# Patient Record
Sex: MALE | Race: WHITE | NOT HISPANIC OR LATINO | Employment: FULL TIME | URBAN - METROPOLITAN AREA
[De-identification: names, ages, dates, MRNs, and addresses within clinical notes are randomized per-mention and may not be internally consistent; named-entity substitution may affect disease eponyms.]

---

## 2017-02-08 ENCOUNTER — OFFICE VISIT - HEALTHEAST (OUTPATIENT)
Dept: FAMILY MEDICINE | Facility: CLINIC | Age: 32
End: 2017-02-08

## 2017-02-08 DIAGNOSIS — E78.00 HYPERCHOLESTEREMIA: ICD-10-CM

## 2017-02-08 DIAGNOSIS — Z00.00 ROUTINE GENERAL MEDICAL EXAMINATION AT A HEALTH CARE FACILITY: ICD-10-CM

## 2017-02-08 DIAGNOSIS — Z30.09 ENCOUNTER FOR VASECTOMY COUNSELING: ICD-10-CM

## 2017-02-08 DIAGNOSIS — L91.8 SKIN TAG: ICD-10-CM

## 2017-02-08 DIAGNOSIS — E66.09 NON MORBID OBESITY DUE TO EXCESS CALORIES: ICD-10-CM

## 2017-02-08 LAB — HBA1C MFR BLD: 6.1 % (ref 3.5–6)

## 2017-02-08 ASSESSMENT — MIFFLIN-ST. JEOR: SCORE: 2349.21

## 2017-02-09 ENCOUNTER — COMMUNICATION - HEALTHEAST (OUTPATIENT)
Dept: FAMILY MEDICINE | Facility: CLINIC | Age: 32
End: 2017-02-09

## 2017-02-09 LAB
CHOLEST SERPL-MCNC: 210 MG/DL
FASTING STATUS PATIENT QL REPORTED: NO
HDLC SERPL-MCNC: 49 MG/DL

## 2017-03-31 ENCOUNTER — AMBULATORY - HEALTHEAST (OUTPATIENT)
Dept: FAMILY MEDICINE | Facility: CLINIC | Age: 32
End: 2017-03-31

## 2017-03-31 DIAGNOSIS — L91.8 SKIN TAG: ICD-10-CM

## 2017-03-31 DIAGNOSIS — Z30.2 ENCOUNTER FOR VASECTOMY: ICD-10-CM

## 2017-04-04 LAB
LAB AP CHARGES (HE HISTORICAL CONVERSION): NORMAL
PATH REPORT.COMMENTS IMP SPEC: NORMAL
PATH REPORT.FINAL DX SPEC: NORMAL
PATH REPORT.GROSS SPEC: NORMAL
PATH REPORT.MICROSCOPIC SPEC OTHER STN: NORMAL
PATH REPORT.RELEVANT HX SPEC: NORMAL
RESULT FLAG (HE HISTORICAL CONVERSION): NORMAL

## 2017-06-01 ENCOUNTER — COMMUNICATION - HEALTHEAST (OUTPATIENT)
Dept: FAMILY MEDICINE | Facility: CLINIC | Age: 32
End: 2017-06-01

## 2017-06-27 ENCOUNTER — AMBULATORY - HEALTHEAST (OUTPATIENT)
Dept: LAB | Facility: CLINIC | Age: 32
End: 2017-06-27

## 2017-06-27 DIAGNOSIS — Z30.2 ENCOUNTER FOR VASECTOMY: ICD-10-CM

## 2017-11-09 ENCOUNTER — OFFICE VISIT - HEALTHEAST (OUTPATIENT)
Dept: FAMILY MEDICINE | Facility: CLINIC | Age: 32
End: 2017-11-09

## 2017-11-09 DIAGNOSIS — D49.2 ATYPICAL SQUAMOPROLIFERATIVE SKIN LESION: ICD-10-CM

## 2018-06-04 ENCOUNTER — OFFICE VISIT - HEALTHEAST (OUTPATIENT)
Dept: FAMILY MEDICINE | Facility: CLINIC | Age: 33
End: 2018-06-04

## 2018-06-04 DIAGNOSIS — M25.569 PAIN IN JOINT, LOWER LEG: ICD-10-CM

## 2018-06-04 DIAGNOSIS — E66.3 OVERWEIGHT: ICD-10-CM

## 2018-06-04 DIAGNOSIS — Z00.00 ROUTINE GENERAL MEDICAL EXAMINATION AT A HEALTH CARE FACILITY: ICD-10-CM

## 2018-06-04 DIAGNOSIS — M54.9 BACK PAIN: ICD-10-CM

## 2018-06-04 DIAGNOSIS — K14.6 PAINFUL TONGUE: ICD-10-CM

## 2018-06-04 DIAGNOSIS — Z87.891 SMOKING HISTORY: ICD-10-CM

## 2018-06-04 DIAGNOSIS — R68.2 DRY MOUTH: ICD-10-CM

## 2018-06-04 LAB
ALBUMIN SERPL-MCNC: 3.6 G/DL (ref 3.5–5)
ALP SERPL-CCNC: 82 U/L (ref 45–120)
ALT SERPL W P-5'-P-CCNC: 197 U/L (ref 0–45)
ANION GAP SERPL CALCULATED.3IONS-SCNC: 8 MMOL/L (ref 5–18)
AST SERPL W P-5'-P-CCNC: 106 U/L (ref 0–40)
BILIRUB SERPL-MCNC: 0.4 MG/DL (ref 0–1)
BUN SERPL-MCNC: 13 MG/DL (ref 8–22)
CALCIUM SERPL-MCNC: 9.5 MG/DL (ref 8.5–10.5)
CHLORIDE BLD-SCNC: 107 MMOL/L (ref 98–107)
CHOLEST SERPL-MCNC: 204 MG/DL
CO2 SERPL-SCNC: 25 MMOL/L (ref 22–31)
CREAT SERPL-MCNC: 0.71 MG/DL (ref 0.7–1.3)
FASTING STATUS PATIENT QL REPORTED: YES
GFR SERPL CREATININE-BSD FRML MDRD: >60 ML/MIN/1.73M2
GLUCOSE BLD-MCNC: 122 MG/DL (ref 70–125)
HBA1C MFR BLD: 6.4 % (ref 3.5–6)
HDLC SERPL-MCNC: 43 MG/DL
LDLC SERPL CALC-MCNC: 123 MG/DL
POTASSIUM BLD-SCNC: 4.4 MMOL/L (ref 3.5–5)
PROT SERPL-MCNC: 7.6 G/DL (ref 6–8)
SODIUM SERPL-SCNC: 140 MMOL/L (ref 136–145)
TRIGL SERPL-MCNC: 192 MG/DL

## 2018-06-04 ASSESSMENT — MIFFLIN-ST. JEOR: SCORE: 2432.66

## 2018-06-06 ENCOUNTER — AMBULATORY - HEALTHEAST (OUTPATIENT)
Dept: FAMILY MEDICINE | Facility: CLINIC | Age: 33
End: 2018-06-06

## 2018-06-06 ENCOUNTER — COMMUNICATION - HEALTHEAST (OUTPATIENT)
Dept: FAMILY MEDICINE | Facility: CLINIC | Age: 33
End: 2018-06-06

## 2018-06-06 DIAGNOSIS — R74.8 ELEVATED LIVER ENZYMES: ICD-10-CM

## 2018-06-14 ENCOUNTER — AMBULATORY - HEALTHEAST (OUTPATIENT)
Dept: LAB | Facility: CLINIC | Age: 33
End: 2018-06-14

## 2018-06-14 DIAGNOSIS — R74.8 ELEVATED LIVER ENZYMES: ICD-10-CM

## 2018-06-14 LAB
ALBUMIN SERPL-MCNC: 3.8 G/DL (ref 3.5–5)
ALP SERPL-CCNC: 82 U/L (ref 45–120)
ALT SERPL W P-5'-P-CCNC: 257 U/L (ref 0–45)
AST SERPL W P-5'-P-CCNC: 181 U/L (ref 0–40)
BILIRUB DIRECT SERPL-MCNC: 0.4 MG/DL
BILIRUB SERPL-MCNC: 1.3 MG/DL (ref 0–1)
GGT SERPL-CCNC: 81 U/L (ref 0–50)
PROT SERPL-MCNC: 7.5 G/DL (ref 6–8)

## 2018-06-15 ENCOUNTER — AMBULATORY - HEALTHEAST (OUTPATIENT)
Dept: FAMILY MEDICINE | Facility: CLINIC | Age: 33
End: 2018-06-15

## 2018-06-15 ENCOUNTER — COMMUNICATION - HEALTHEAST (OUTPATIENT)
Dept: FAMILY MEDICINE | Facility: CLINIC | Age: 33
End: 2018-06-15

## 2018-06-15 DIAGNOSIS — R74.8 ELEVATED LIVER ENZYMES: ICD-10-CM

## 2018-06-15 LAB
HAV IGM SERPL QL IA: NEGATIVE
HBV CORE IGM SERPL QL IA: NEGATIVE
HBV SURFACE AG SERPL QL IA: NEGATIVE
HCV AB SERPL QL IA: NEGATIVE

## 2018-06-16 ENCOUNTER — HOSPITAL ENCOUNTER (OUTPATIENT)
Dept: ULTRASOUND IMAGING | Facility: CLINIC | Age: 33
Discharge: HOME | End: 2018-06-16

## 2018-06-16 DIAGNOSIS — R74.8 ELEVATED LIVER ENZYMES: ICD-10-CM

## 2018-06-21 ENCOUNTER — COMMUNICATION - HEALTHEAST (OUTPATIENT)
Dept: FAMILY MEDICINE | Facility: CLINIC | Age: 33
End: 2018-06-21

## 2018-06-21 ENCOUNTER — OFFICE VISIT - HEALTHEAST (OUTPATIENT)
Dept: OTOLARYNGOLOGY | Facility: CLINIC | Age: 33
End: 2018-06-21

## 2018-06-21 DIAGNOSIS — K14.6 BURNING TONGUE: ICD-10-CM

## 2019-01-04 ENCOUNTER — OFFICE VISIT - HEALTHEAST (OUTPATIENT)
Dept: FAMILY MEDICINE | Facility: CLINIC | Age: 34
End: 2019-01-04

## 2019-01-04 DIAGNOSIS — R73.01 IMPAIRED FASTING GLUCOSE: ICD-10-CM

## 2019-01-04 DIAGNOSIS — M79.622 PAIN OF LEFT UPPER ARM: ICD-10-CM

## 2019-01-04 DIAGNOSIS — R79.89 ABNORMAL LFTS: ICD-10-CM

## 2019-01-04 DIAGNOSIS — Z00.00 ROUTINE GENERAL MEDICAL EXAMINATION AT A HEALTH CARE FACILITY: ICD-10-CM

## 2019-01-04 DIAGNOSIS — M25.562 LEFT KNEE PAIN, UNSPECIFIED CHRONICITY: ICD-10-CM

## 2019-01-04 DIAGNOSIS — F41.9 ANXIETY DISORDER, UNSPECIFIED TYPE: ICD-10-CM

## 2019-01-04 DIAGNOSIS — E66.812 CLASS 2 OBESITY DUE TO EXCESS CALORIES WITHOUT SERIOUS COMORBIDITY WITH BODY MASS INDEX (BMI) OF 35.0 TO 35.9 IN ADULT: ICD-10-CM

## 2019-01-04 DIAGNOSIS — K76.0 HEPATIC STEATOSIS: ICD-10-CM

## 2019-01-04 DIAGNOSIS — E66.09 CLASS 2 OBESITY DUE TO EXCESS CALORIES WITHOUT SERIOUS COMORBIDITY WITH BODY MASS INDEX (BMI) OF 35.0 TO 35.9 IN ADULT: ICD-10-CM

## 2019-01-04 LAB
ALBUMIN SERPL-MCNC: 4.2 G/DL (ref 3.5–5)
ALP SERPL-CCNC: 58 U/L (ref 45–120)
ALT SERPL W P-5'-P-CCNC: 99 U/L (ref 0–45)
ANION GAP SERPL CALCULATED.3IONS-SCNC: 6 MMOL/L (ref 5–18)
AST SERPL W P-5'-P-CCNC: 61 U/L (ref 0–40)
BILIRUB SERPL-MCNC: 1.1 MG/DL (ref 0–1)
BUN SERPL-MCNC: 15 MG/DL (ref 8–22)
CALCIUM SERPL-MCNC: 9.6 MG/DL (ref 8.5–10.5)
CHLORIDE BLD-SCNC: 105 MMOL/L (ref 98–107)
CHOLEST SERPL-MCNC: 205 MG/DL
CO2 SERPL-SCNC: 29 MMOL/L (ref 22–31)
CREAT SERPL-MCNC: 0.86 MG/DL (ref 0.7–1.3)
ERYTHROCYTE [DISTWIDTH] IN BLOOD BY AUTOMATED COUNT: 11.8 % (ref 11–14.5)
FASTING STATUS PATIENT QL REPORTED: YES
GFR SERPL CREATININE-BSD FRML MDRD: >60 ML/MIN/1.73M2
GGT SERPL-CCNC: 53 U/L (ref 0–50)
GLUCOSE BLD-MCNC: 82 MG/DL (ref 70–125)
HBA1C MFR BLD: 5.6 % (ref 3.5–6)
HCT VFR BLD AUTO: 44.1 % (ref 40–54)
HDLC SERPL-MCNC: 65 MG/DL
HGB BLD-MCNC: 15.4 G/DL (ref 14–18)
LDLC SERPL CALC-MCNC: 126 MG/DL
MCH RBC QN AUTO: 31.8 PG (ref 27–34)
MCHC RBC AUTO-ENTMCNC: 34.8 G/DL (ref 32–36)
MCV RBC AUTO: 91 FL (ref 80–100)
PLATELET # BLD AUTO: 115 THOU/UL (ref 140–440)
PMV BLD AUTO: 8.3 FL (ref 7–10)
POTASSIUM BLD-SCNC: 4.4 MMOL/L (ref 3.5–5)
PROT SERPL-MCNC: 8.2 G/DL (ref 6–8)
RBC # BLD AUTO: 4.83 MILL/UL (ref 4.4–6.2)
SODIUM SERPL-SCNC: 140 MMOL/L (ref 136–145)
TRIGL SERPL-MCNC: 71 MG/DL
WBC: 5.3 THOU/UL (ref 4–11)

## 2019-01-04 ASSESSMENT — MIFFLIN-ST. JEOR: SCORE: 2298.73

## 2019-04-04 ENCOUNTER — OFFICE VISIT - HEALTHEAST (OUTPATIENT)
Dept: FAMILY MEDICINE | Facility: CLINIC | Age: 34
End: 2019-04-04

## 2019-04-04 DIAGNOSIS — E66.09 CLASS 1 OBESITY DUE TO EXCESS CALORIES WITHOUT SERIOUS COMORBIDITY WITH BODY MASS INDEX (BMI) OF 33.0 TO 33.9 IN ADULT: ICD-10-CM

## 2019-04-04 DIAGNOSIS — R68.82 DECREASED LIBIDO: ICD-10-CM

## 2019-04-04 DIAGNOSIS — M54.42 ACUTE LEFT-SIDED LOW BACK PAIN WITH LEFT-SIDED SCIATICA: ICD-10-CM

## 2019-04-04 DIAGNOSIS — F41.9 ANXIETY DISORDER, UNSPECIFIED TYPE: ICD-10-CM

## 2019-04-04 DIAGNOSIS — E66.811 CLASS 1 OBESITY DUE TO EXCESS CALORIES WITHOUT SERIOUS COMORBIDITY WITH BODY MASS INDEX (BMI) OF 33.0 TO 33.9 IN ADULT: ICD-10-CM

## 2019-04-04 DIAGNOSIS — R79.89 ABNORMAL LFTS: ICD-10-CM

## 2019-04-05 LAB
ALBUMIN SERPL-MCNC: 4.3 G/DL (ref 3.5–5)
ALP SERPL-CCNC: 48 U/L (ref 45–120)
ALT SERPL W P-5'-P-CCNC: 35 U/L (ref 0–45)
ANION GAP SERPL CALCULATED.3IONS-SCNC: 8 MMOL/L (ref 5–18)
AST SERPL W P-5'-P-CCNC: 33 U/L (ref 0–40)
BILIRUB SERPL-MCNC: 0.7 MG/DL (ref 0–1)
BUN SERPL-MCNC: 15 MG/DL (ref 8–22)
CALCIUM SERPL-MCNC: 10.2 MG/DL (ref 8.5–10.5)
CHLORIDE BLD-SCNC: 105 MMOL/L (ref 98–107)
CO2 SERPL-SCNC: 25 MMOL/L (ref 22–31)
CREAT SERPL-MCNC: 0.79 MG/DL (ref 0.7–1.3)
GFR SERPL CREATININE-BSD FRML MDRD: >60 ML/MIN/1.73M2
GGT SERPL-CCNC: 35 U/L (ref 0–50)
GLUCOSE BLD-MCNC: 77 MG/DL (ref 70–125)
POTASSIUM BLD-SCNC: 4.6 MMOL/L (ref 3.5–5)
PROT SERPL-MCNC: 8.3 G/DL (ref 6–8)
SODIUM SERPL-SCNC: 138 MMOL/L (ref 136–145)

## 2019-06-03 ENCOUNTER — COMMUNICATION - HEALTHEAST (OUTPATIENT)
Dept: FAMILY MEDICINE | Facility: CLINIC | Age: 34
End: 2019-06-03

## 2019-06-03 DIAGNOSIS — F41.9 ANXIETY DISORDER, UNSPECIFIED TYPE: ICD-10-CM

## 2019-06-04 ENCOUNTER — AMBULATORY - HEALTHEAST (OUTPATIENT)
Dept: FAMILY MEDICINE | Facility: CLINIC | Age: 34
End: 2019-06-04

## 2019-06-04 DIAGNOSIS — M54.42 ACUTE LEFT-SIDED LOW BACK PAIN WITH LEFT-SIDED SCIATICA: ICD-10-CM

## 2019-06-07 ENCOUNTER — HOSPITAL ENCOUNTER (OUTPATIENT)
Dept: PHYSICAL MEDICINE AND REHAB | Facility: CLINIC | Age: 34
Discharge: HOME | End: 2019-06-07
Attending: FAMILY MEDICINE

## 2019-06-07 ENCOUNTER — HOSPITAL ENCOUNTER (OUTPATIENT)
Dept: RADIOLOGY | Facility: HOSPITAL | Age: 34
Discharge: HOME | End: 2019-06-07

## 2019-06-07 ENCOUNTER — COMMUNICATION - HEALTHEAST (OUTPATIENT)
Dept: PHYSICAL MEDICINE AND REHAB | Facility: CLINIC | Age: 34
End: 2019-06-07

## 2019-06-07 DIAGNOSIS — M54.16 CHRONIC LEFT LUMBAR RADICULOPATHY: ICD-10-CM

## 2019-06-07 DIAGNOSIS — G89.29 CHRONIC LEFT-SIDED LOW BACK PAIN WITH LEFT-SIDED SCIATICA: ICD-10-CM

## 2019-06-07 DIAGNOSIS — M54.42 CHRONIC LEFT-SIDED LOW BACK PAIN WITH LEFT-SIDED SCIATICA: ICD-10-CM

## 2019-06-11 ENCOUNTER — OFFICE VISIT - HEALTHEAST (OUTPATIENT)
Dept: PHYSICAL THERAPY | Facility: CLINIC | Age: 34
End: 2019-06-11

## 2019-06-11 DIAGNOSIS — M54.42 CHRONIC LEFT-SIDED LOW BACK PAIN WITH LEFT-SIDED SCIATICA: ICD-10-CM

## 2019-06-11 DIAGNOSIS — G89.29 CHRONIC LEFT-SIDED LOW BACK PAIN WITH LEFT-SIDED SCIATICA: ICD-10-CM

## 2019-06-14 ENCOUNTER — OFFICE VISIT - HEALTHEAST (OUTPATIENT)
Dept: PHYSICAL THERAPY | Facility: CLINIC | Age: 34
End: 2019-06-14

## 2019-06-14 DIAGNOSIS — G89.29 CHRONIC LEFT-SIDED LOW BACK PAIN WITH LEFT-SIDED SCIATICA: ICD-10-CM

## 2019-06-14 DIAGNOSIS — M54.42 CHRONIC LEFT-SIDED LOW BACK PAIN WITH LEFT-SIDED SCIATICA: ICD-10-CM

## 2019-06-17 ENCOUNTER — OFFICE VISIT - HEALTHEAST (OUTPATIENT)
Dept: PHYSICAL THERAPY | Facility: CLINIC | Age: 34
End: 2019-06-17

## 2019-06-17 DIAGNOSIS — G89.29 CHRONIC LEFT-SIDED LOW BACK PAIN WITH LEFT-SIDED SCIATICA: ICD-10-CM

## 2019-06-17 DIAGNOSIS — M54.42 CHRONIC LEFT-SIDED LOW BACK PAIN WITH LEFT-SIDED SCIATICA: ICD-10-CM

## 2019-06-21 ENCOUNTER — OFFICE VISIT - HEALTHEAST (OUTPATIENT)
Dept: PHYSICAL THERAPY | Facility: CLINIC | Age: 34
End: 2019-06-21

## 2019-06-21 DIAGNOSIS — G89.29 CHRONIC LEFT-SIDED LOW BACK PAIN WITH LEFT-SIDED SCIATICA: ICD-10-CM

## 2019-06-21 DIAGNOSIS — M54.42 CHRONIC LEFT-SIDED LOW BACK PAIN WITH LEFT-SIDED SCIATICA: ICD-10-CM

## 2019-06-24 ENCOUNTER — OFFICE VISIT - HEALTHEAST (OUTPATIENT)
Dept: PHYSICAL THERAPY | Facility: CLINIC | Age: 34
End: 2019-06-24

## 2019-06-24 DIAGNOSIS — G89.29 CHRONIC LEFT-SIDED LOW BACK PAIN WITH LEFT-SIDED SCIATICA: ICD-10-CM

## 2019-06-24 DIAGNOSIS — M54.42 CHRONIC LEFT-SIDED LOW BACK PAIN WITH LEFT-SIDED SCIATICA: ICD-10-CM

## 2019-06-27 ENCOUNTER — COMMUNICATION - HEALTHEAST (OUTPATIENT)
Dept: PHYSICAL THERAPY | Facility: CLINIC | Age: 34
End: 2019-06-27

## 2019-07-03 ENCOUNTER — OFFICE VISIT - HEALTHEAST (OUTPATIENT)
Dept: PHYSICAL THERAPY | Facility: CLINIC | Age: 34
End: 2019-07-03

## 2019-07-03 DIAGNOSIS — M54.42 CHRONIC LEFT-SIDED LOW BACK PAIN WITH LEFT-SIDED SCIATICA: ICD-10-CM

## 2019-07-03 DIAGNOSIS — G89.29 CHRONIC LEFT-SIDED LOW BACK PAIN WITH LEFT-SIDED SCIATICA: ICD-10-CM

## 2019-07-05 ENCOUNTER — OFFICE VISIT - HEALTHEAST (OUTPATIENT)
Dept: PHYSICAL THERAPY | Facility: CLINIC | Age: 34
End: 2019-07-05

## 2019-07-05 DIAGNOSIS — G89.29 CHRONIC LEFT-SIDED LOW BACK PAIN WITH LEFT-SIDED SCIATICA: ICD-10-CM

## 2019-07-05 DIAGNOSIS — M54.42 CHRONIC LEFT-SIDED LOW BACK PAIN WITH LEFT-SIDED SCIATICA: ICD-10-CM

## 2019-07-12 ENCOUNTER — OFFICE VISIT - HEALTHEAST (OUTPATIENT)
Dept: PHYSICAL THERAPY | Facility: CLINIC | Age: 34
End: 2019-07-12

## 2019-07-12 DIAGNOSIS — M54.42 CHRONIC LEFT-SIDED LOW BACK PAIN WITH LEFT-SIDED SCIATICA: ICD-10-CM

## 2019-07-12 DIAGNOSIS — G89.29 CHRONIC LEFT-SIDED LOW BACK PAIN WITH LEFT-SIDED SCIATICA: ICD-10-CM

## 2019-07-16 ENCOUNTER — OFFICE VISIT - HEALTHEAST (OUTPATIENT)
Dept: PHYSICAL THERAPY | Facility: CLINIC | Age: 34
End: 2019-07-16

## 2019-07-16 DIAGNOSIS — M54.42 CHRONIC LEFT-SIDED LOW BACK PAIN WITH LEFT-SIDED SCIATICA: ICD-10-CM

## 2019-07-16 DIAGNOSIS — G89.29 CHRONIC LEFT-SIDED LOW BACK PAIN WITH LEFT-SIDED SCIATICA: ICD-10-CM

## 2019-07-19 ENCOUNTER — OFFICE VISIT - HEALTHEAST (OUTPATIENT)
Dept: PHYSICAL THERAPY | Facility: CLINIC | Age: 34
End: 2019-07-19

## 2019-07-19 DIAGNOSIS — M54.42 CHRONIC LEFT-SIDED LOW BACK PAIN WITH LEFT-SIDED SCIATICA: ICD-10-CM

## 2019-07-19 DIAGNOSIS — G89.29 CHRONIC LEFT-SIDED LOW BACK PAIN WITH LEFT-SIDED SCIATICA: ICD-10-CM

## 2020-01-23 ENCOUNTER — OFFICE VISIT - HEALTHEAST (OUTPATIENT)
Dept: FAMILY MEDICINE | Facility: CLINIC | Age: 35
End: 2020-01-23

## 2020-01-23 DIAGNOSIS — K76.0 HEPATIC STEATOSIS: ICD-10-CM

## 2020-01-23 DIAGNOSIS — E66.09 CLASS 1 OBESITY DUE TO EXCESS CALORIES WITHOUT SERIOUS COMORBIDITY WITH BODY MASS INDEX (BMI) OF 32.0 TO 32.9 IN ADULT: ICD-10-CM

## 2020-01-23 DIAGNOSIS — R19.7 DIARRHEA, UNSPECIFIED TYPE: ICD-10-CM

## 2020-01-23 DIAGNOSIS — Z00.00 ROUTINE GENERAL MEDICAL EXAMINATION AT A HEALTH CARE FACILITY: ICD-10-CM

## 2020-01-23 DIAGNOSIS — Z83.79 FAMILY HISTORY OF ULCERATIVE COLITIS: ICD-10-CM

## 2020-01-23 DIAGNOSIS — F41.9 ANXIETY DISORDER, UNSPECIFIED TYPE: ICD-10-CM

## 2020-01-23 DIAGNOSIS — R73.01 IMPAIRED FASTING GLUCOSE: ICD-10-CM

## 2020-01-23 DIAGNOSIS — E66.811 CLASS 1 OBESITY DUE TO EXCESS CALORIES WITHOUT SERIOUS COMORBIDITY WITH BODY MASS INDEX (BMI) OF 32.0 TO 32.9 IN ADULT: ICD-10-CM

## 2020-01-23 LAB
ALBUMIN SERPL-MCNC: 4.3 G/DL (ref 3.5–5)
ALP SERPL-CCNC: 44 U/L (ref 45–120)
ALT SERPL W P-5'-P-CCNC: 53 U/L (ref 0–45)
ANION GAP SERPL CALCULATED.3IONS-SCNC: 7 MMOL/L (ref 5–18)
AST SERPL W P-5'-P-CCNC: 48 U/L (ref 0–40)
BILIRUB SERPL-MCNC: 0.7 MG/DL (ref 0–1)
BUN SERPL-MCNC: 14 MG/DL (ref 8–22)
CALCIUM SERPL-MCNC: 9.6 MG/DL (ref 8.5–10.5)
CHLORIDE BLD-SCNC: 103 MMOL/L (ref 98–107)
CO2 SERPL-SCNC: 28 MMOL/L (ref 22–31)
CREAT SERPL-MCNC: 0.77 MG/DL (ref 0.7–1.3)
ERYTHROCYTE [DISTWIDTH] IN BLOOD BY AUTOMATED COUNT: 11.2 % (ref 11–14.5)
GFR SERPL CREATININE-BSD FRML MDRD: >60 ML/MIN/1.73M2
GLUCOSE BLD-MCNC: 77 MG/DL (ref 70–125)
HBA1C MFR BLD: 5.6 % (ref 3.5–6)
HCT VFR BLD AUTO: 45 % (ref 40–54)
HGB BLD-MCNC: 15.3 G/DL (ref 14–18)
LIPASE SERPL-CCNC: 13 U/L (ref 0–52)
MCH RBC QN AUTO: 31.8 PG (ref 27–34)
MCHC RBC AUTO-ENTMCNC: 33.9 G/DL (ref 32–36)
MCV RBC AUTO: 94 FL (ref 80–100)
PLATELET # BLD AUTO: 133 THOU/UL (ref 140–440)
PMV BLD AUTO: 8.2 FL (ref 7–10)
POTASSIUM BLD-SCNC: 4.3 MMOL/L (ref 3.5–5)
PROT SERPL-MCNC: 7.8 G/DL (ref 6–8)
RBC # BLD AUTO: 4.79 MILL/UL (ref 4.4–6.2)
SODIUM SERPL-SCNC: 138 MMOL/L (ref 136–145)
WBC: 4.6 THOU/UL (ref 4–11)

## 2020-01-23 ASSESSMENT — ANXIETY QUESTIONNAIRES
3. WORRYING TOO MUCH ABOUT DIFFERENT THINGS: SEVERAL DAYS
7. FEELING AFRAID AS IF SOMETHING AWFUL MIGHT HAPPEN: NOT AT ALL
5. BEING SO RESTLESS THAT IT IS HARD TO SIT STILL: NOT AT ALL
IF YOU CHECKED OFF ANY PROBLEMS ON THIS QUESTIONNAIRE, HOW DIFFICULT HAVE THESE PROBLEMS MADE IT FOR YOU TO DO YOUR WORK, TAKE CARE OF THINGS AT HOME, OR GET ALONG WITH OTHER PEOPLE: NOT DIFFICULT AT ALL
2. NOT BEING ABLE TO STOP OR CONTROL WORRYING: NOT AT ALL
GAD7 TOTAL SCORE: 3
1. FEELING NERVOUS, ANXIOUS, OR ON EDGE: SEVERAL DAYS
4. TROUBLE RELAXING: NOT AT ALL
6. BECOMING EASILY ANNOYED OR IRRITABLE: SEVERAL DAYS

## 2020-01-23 ASSESSMENT — MIFFLIN-ST. JEOR: SCORE: 2212.55

## 2020-01-23 ASSESSMENT — PATIENT HEALTH QUESTIONNAIRE - PHQ9: SUM OF ALL RESPONSES TO PHQ QUESTIONS 1-9: 2

## 2020-02-17 ENCOUNTER — COMMUNICATION - HEALTHEAST (OUTPATIENT)
Dept: FAMILY MEDICINE | Facility: CLINIC | Age: 35
End: 2020-02-17

## 2020-02-17 ENCOUNTER — AMBULATORY - HEALTHEAST (OUTPATIENT)
Dept: FAMILY MEDICINE | Facility: CLINIC | Age: 35
End: 2020-02-17

## 2020-02-17 DIAGNOSIS — F41.9 ANXIETY DISORDER, UNSPECIFIED TYPE: ICD-10-CM

## 2020-07-27 ENCOUNTER — COMMUNICATION - HEALTHEAST (OUTPATIENT)
Dept: FAMILY MEDICINE | Facility: CLINIC | Age: 35
End: 2020-07-27

## 2020-07-27 DIAGNOSIS — F41.9 ANXIETY DISORDER, UNSPECIFIED TYPE: ICD-10-CM

## 2020-11-21 ENCOUNTER — COMMUNICATION - HEALTHEAST (OUTPATIENT)
Dept: FAMILY MEDICINE | Facility: CLINIC | Age: 35
End: 2020-11-21

## 2020-11-21 DIAGNOSIS — F41.9 ANXIETY DISORDER, UNSPECIFIED TYPE: ICD-10-CM

## 2021-03-30 ENCOUNTER — AMBULATORY - HEALTHEAST (OUTPATIENT)
Dept: FAMILY MEDICINE | Facility: CLINIC | Age: 36
End: 2021-03-30

## 2021-03-30 ENCOUNTER — OFFICE VISIT - HEALTHEAST (OUTPATIENT)
Dept: FAMILY MEDICINE | Facility: CLINIC | Age: 36
End: 2021-03-30

## 2021-03-30 DIAGNOSIS — L08.9 RIGHT KNEE SKIN INFECTION: ICD-10-CM

## 2021-03-30 DIAGNOSIS — R03.0 ELEVATED BLOOD PRESSURE READING WITHOUT DIAGNOSIS OF HYPERTENSION: ICD-10-CM

## 2021-03-30 DIAGNOSIS — R79.89 ABNORMAL LFTS: ICD-10-CM

## 2021-03-30 DIAGNOSIS — R73.01 IMPAIRED FASTING GLUCOSE: ICD-10-CM

## 2021-03-30 DIAGNOSIS — F10.10 ALCOHOL ABUSE: ICD-10-CM

## 2021-03-30 DIAGNOSIS — D69.6 THROMBOCYTOPENIA (H): ICD-10-CM

## 2021-03-30 DIAGNOSIS — E66.09 CLASS 1 OBESITY DUE TO EXCESS CALORIES WITH SERIOUS COMORBIDITY AND BODY MASS INDEX (BMI) OF 34.0 TO 34.9 IN ADULT: ICD-10-CM

## 2021-03-30 DIAGNOSIS — Z00.00 ROUTINE GENERAL MEDICAL EXAMINATION AT A HEALTH CARE FACILITY: ICD-10-CM

## 2021-03-30 DIAGNOSIS — F41.9 ANXIETY DISORDER, UNSPECIFIED TYPE: ICD-10-CM

## 2021-03-30 DIAGNOSIS — Z11.4 ENCOUNTER FOR SCREENING FOR HIV: ICD-10-CM

## 2021-03-30 DIAGNOSIS — E66.811 CLASS 1 OBESITY DUE TO EXCESS CALORIES WITH SERIOUS COMORBIDITY AND BODY MASS INDEX (BMI) OF 34.0 TO 34.9 IN ADULT: ICD-10-CM

## 2021-03-30 DIAGNOSIS — D72.819 LEUKOPENIA, UNSPECIFIED TYPE: ICD-10-CM

## 2021-03-30 LAB
ALBUMIN SERPL-MCNC: 3.6 G/DL (ref 3.5–5)
ALP SERPL-CCNC: 74 U/L (ref 45–120)
ALT SERPL W P-5'-P-CCNC: 364 U/L (ref 0–45)
ANION GAP SERPL CALCULATED.3IONS-SCNC: 9 MMOL/L (ref 5–18)
AST SERPL W P-5'-P-CCNC: 330 U/L (ref 0–40)
BILIRUB SERPL-MCNC: 0.8 MG/DL (ref 0–1)
BUN SERPL-MCNC: 10 MG/DL (ref 8–22)
CALCIUM SERPL-MCNC: 8.9 MG/DL (ref 8.5–10.5)
CHLORIDE BLD-SCNC: 107 MMOL/L (ref 98–107)
CHOLEST SERPL-MCNC: 206 MG/DL
CO2 SERPL-SCNC: 25 MMOL/L (ref 22–31)
CREAT SERPL-MCNC: 0.69 MG/DL (ref 0.7–1.3)
ERYTHROCYTE [DISTWIDTH] IN BLOOD BY AUTOMATED COUNT: 12.7 % (ref 11–14.5)
FASTING STATUS PATIENT QL REPORTED: YES
GFR SERPL CREATININE-BSD FRML MDRD: >60 ML/MIN/1.73M2
GGT SERPL-CCNC: 156 U/L (ref 0–50)
GLUCOSE BLD-MCNC: 132 MG/DL (ref 70–125)
HBA1C MFR BLD: 5.7 %
HCT VFR BLD AUTO: 42.1 % (ref 40–54)
HDLC SERPL-MCNC: 53 MG/DL
HGB BLD-MCNC: 14.3 G/DL (ref 14–18)
HIV 1+2 AB+HIV1 P24 AG SERPL QL IA: NEGATIVE
LDLC SERPL CALC-MCNC: 122 MG/DL
MCH RBC QN AUTO: 31.8 PG (ref 27–34)
MCHC RBC AUTO-ENTMCNC: 34 G/DL (ref 32–36)
MCV RBC AUTO: 94 FL (ref 80–100)
PLATELET # BLD AUTO: 83 THOU/UL (ref 140–440)
PMV BLD AUTO: 11.6 FL (ref 7–10)
POTASSIUM BLD-SCNC: 4.4 MMOL/L (ref 3.5–5)
PROT SERPL-MCNC: 7.8 G/DL (ref 6–8)
RBC # BLD AUTO: 4.5 MILL/UL (ref 4.4–6.2)
SODIUM SERPL-SCNC: 141 MMOL/L (ref 136–145)
TRIGL SERPL-MCNC: 153 MG/DL
WBC: 2.6 THOU/UL (ref 4–11)

## 2021-03-30 ASSESSMENT — ANXIETY QUESTIONNAIRES
2. NOT BEING ABLE TO STOP OR CONTROL WORRYING: NOT AT ALL
3. WORRYING TOO MUCH ABOUT DIFFERENT THINGS: NOT AT ALL
6. BECOMING EASILY ANNOYED OR IRRITABLE: SEVERAL DAYS
GAD7 TOTAL SCORE: 1
4. TROUBLE RELAXING: NOT AT ALL
7. FEELING AFRAID AS IF SOMETHING AWFUL MIGHT HAPPEN: NOT AT ALL
1. FEELING NERVOUS, ANXIOUS, OR ON EDGE: NOT AT ALL
IF YOU CHECKED OFF ANY PROBLEMS ON THIS QUESTIONNAIRE, HOW DIFFICULT HAVE THESE PROBLEMS MADE IT FOR YOU TO DO YOUR WORK, TAKE CARE OF THINGS AT HOME, OR GET ALONG WITH OTHER PEOPLE: NOT DIFFICULT AT ALL
5. BEING SO RESTLESS THAT IT IS HARD TO SIT STILL: NOT AT ALL

## 2021-03-30 ASSESSMENT — MIFFLIN-ST. JEOR: SCORE: 2297.6

## 2021-03-30 ASSESSMENT — PATIENT HEALTH QUESTIONNAIRE - PHQ9: SUM OF ALL RESPONSES TO PHQ QUESTIONS 1-9: 0

## 2021-05-27 ASSESSMENT — PATIENT HEALTH QUESTIONNAIRE - PHQ9
SUM OF ALL RESPONSES TO PHQ QUESTIONS 1-9: 2
SUM OF ALL RESPONSES TO PHQ QUESTIONS 1-9: 0

## 2021-05-27 NOTE — PROGRESS NOTES
Assessment/Plan:    1. Anxiety disorder, unspecified type  Anxiety disorder, improved with AURELIO 7 questionnaire improving from 15 out of 21 down to 1 out of 21.  Continue sertraline 50 mg daily.  PHQ 9 questionnaire 0 out of 27.  Reassess in approximately 6 months recommended.    2. Abnormal LFTs  History of LFT elevation with question hepatic steatosis etc.  Check GGT and comprehensive metabolic panel.  Prior CBC normal.  - GGT (Gamma GT)  - Comprehensive Metabolic Panel    3. Class 1 obesity due to excess calories without serious comorbidity with body mass index (BMI) of 33.0 to 33.9 in adult  Therapeutic lifestyle changes for weight goal less than 260 pounds initially, less than 240 pounds ideally.  Has had 13 pound weight loss since January 4, 2019 and will continue therapeutic lifestyle changes.    4. Decreased libido  Decreased libido associated with sertraline however improvement noted.  We will continue to monitor.    5. Acute left-sided low back pain with left-sided sciatica  Acute left-sided lower back pain with a left lower extremity sciatica described since Friday.  Medrol Dosepak prescribed.  Notify persistent concerns or if worsening.  - methylPREDNISolone (MEDROL DOSEPACK) 4 mg tablet; Take 1 tablet (4 mg total) by mouth daily for 6 days. Follow package directions  Dispense: 21 tablet; Refill: 0      The following high BMI interventions were performed this visit: encouragement to exercise, weight monitoring, weight loss from baseline weight and lifestyle education regarding diet.  Ensure ongoing efforts to achieve weight goal < 260 pounds initially, < 240 pounds ideally.         Subjective:    Siva Rowan is seen today for follow-up evaluation.  Was seen January 4, 2019 with anxiety issues.  Started on sertraline 50 mg daily.  Initially a decreased libido however now back to normal.  Less agitated less fixated on things.  More forgetful however.  Has had LFT elevation before with improvement with  GGT 53 and AST 61 and ALT 99 on 2019.  3-4 vodka sodas per week otherwise not drinking beer.  Trying to stay active.  Lower back pain.  Started last Friday.  Throat is back.  Now has some numbness in anterior left thigh primarily.  Does not radiate below level of knee.  No right leg concerns.  Comprehensive review of systems as above otherwise all negative.    Engaged - Connie  1 son - 13  1 daughter - 8  Tobacco: none (quit ~ )  EtOH: occ  Mom - mild diabetes  Dad -   2 younger sis -   Surgeries: none  Hospitalizations: none   Work: auto glass installation  Hobbies:  [employer]: football, baseball; plays softball    History reviewed. No pertinent surgical history.     Family History   Problem Relation Age of Onset     Diabetes Mother      No Medical Problems Sister      Heart disease Maternal Grandfather         History reviewed. No pertinent past medical history.     Social History     Tobacco Use     Smoking status: Former Smoker     Packs/day: 0.75     Years: 15.00     Pack years: 11.25     Last attempt to quit: 2015     Years since quittin.2     Smokeless tobacco: Never Used   Substance Use Topics     Alcohol use: Yes     Drug use: No        Current Outpatient Medications   Medication Sig Dispense Refill     sertraline (ZOLOFT) 50 MG tablet Take 1 tablet (50 mg total) by mouth daily. 30 tablet 2     methylPREDNISolone (MEDROL DOSEPACK) 4 mg tablet Take 1 tablet (4 mg total) by mouth daily for 6 days. Follow package directions 21 tablet 0     No current facility-administered medications for this visit.           Objective:    Vitals:    19 1557   BP: 110/60   Pulse: 80   SpO2: 98%   Weight: (!) 269 lb (122 kg)      Body mass index is 33.4 kg/m .    Alert.  No apparent distress.  Transfers easily.  Chest clear.  Cardiac exam regular.  Mild psychomotor agitation only.  Extremities warm and dry with DTRs appearing symmetric.      This note has been dictated using  voice recognition software and as a result may contain minor grammatical errors and unintended word substitutions.

## 2021-05-28 ASSESSMENT — ANXIETY QUESTIONNAIRES
GAD7 TOTAL SCORE: 3
GAD7 TOTAL SCORE: 1

## 2021-05-29 NOTE — TELEPHONE ENCOUNTER
Refill Approved    Rx renewed per Medication Renewal Policy. Medication was last renewed on 1/4/19.    Maria Elena Shen, [facility] Triage/Med Refill 6/3/2019     Requested Prescriptions   Pending Prescriptions Disp Refills     sertraline (ZOLOFT) 50 MG tablet 30 tablet 2     Sig: Take 1 tablet (50 mg total) by mouth daily.       SSRI Refill Protocol  Passed - 6/3/2019  8:45 AM        Passed - PCP or prescribing provider visit in last year     Last office visit with prescriber/PCP: 4/4/2019 Joel Adrian MD OR same dept: 4/4/2019 Joel Adrian MD OR same specialty: 4/4/2019 Joel Adrian MD  Last physical: 1/4/2019 Last MTM visit: Visit date not found   Next visit within 3 mo: Visit date not found  Next physical within 3 mo: Visit date not found  Prescriber OR PCP: Joel Adrian MD  Last diagnosis associated with med order: 1. Anxiety disorder, unspecified type  - sertraline (ZOLOFT) 50 MG tablet; Take 1 tablet (50 mg total) by mouth daily.  Dispense: 30 tablet; Refill: 2    If protocol passes may refill for 12 months if within 3 months of last provider visit (or a total of 15 months).

## 2021-05-29 NOTE — PROGRESS NOTES
[facility] Daily Progress     Patient Name: Siva Rowan  Date: 6/14/2019  Visit #: 2/12 per POC  PTA visit #:  -  Referral Diagnosis:   Chronic left-sided low back pain with left-sided sciatica  Referring provider: Deann Nicole C*  Visit Diagnosis:     ICD-10-CM    1. Chronic left-sided low back pain with left-sided sciatica M54.42     G89.29          Assessment:     HEP/POC compliance is  good .    Patient showing good tolerance to MEDX, rotary torso, and HEP. Shows good kinesthetic awareness with exercises. Patient reporting improvements with nerve glides. Appropriate to continue with skilled PT per POC.    Goal Status:  Pt. will be independent with home exercise program in : 12 weeks    Pt will: decrease pain (at its worst) to a 3/10 following provoking activity or prolonged driving in 8 weeks  Pt will: increase strength on lumbar MedX by 30# in 8 weeks  Pt will: return to yoga and gym exercise routines without pain in 8 weeks      Plan / Patient Education:     Continue with initial plan of care.     Plan for next visit: MEDX DE, rotary torso, core stab- hip stretching.     Subjective:     Pain Rating: Maybe a 1    Back is feeling good. He's been doing his stretches, plus starting some of the light yoga.    Objective:     Patient is in the 1st week of MEDX program.    Good TA setting.    Lumbar MEDX:  Enter Week / Visit #: W1 V2  Weight (lbs): 124#'s  Reps (#): 20  Time: 177  ROM (degrees): 0-51  Flex:Ext ratio: 2.06:1    Exercises:  Exercise #1: Tall kneeling hip flexor stretch; hold 30 seconds  Comment #1: Sciatic nerve glide, supine, x 10  Exercise #2: Treadmill x 4 minutes  Comment #2: Rotary torso 90 seconds 36#'s started to the R  Exercise #3: Bridge X 10 Hold 5 seconds  Comment #3: TA Leg Extension X 10     Treatment Today     TREATMENT MINUTES COMMENTS   Evaluation     Self-care/ Home management     Manual therapy     Neuromuscular Re-education     Therapeutic Activity      Therapeutic Exercises 27    Gait training     Modality__________________                Total 27    Blank areas are intentional and mean the treatment did not include these items.       Keyur SINGER  6/14/2019

## 2021-05-29 NOTE — PROGRESS NOTES
[facility] Daily Progress     Patient Name: Siva Rowan  Date: 2019  Visit #:  per POC  PTA visit #:  -  Referral Diagnosis:   Chronic left-sided low back pain with left-sided sciatica  Referring provider: Deann Nicole C*  Visit Diagnosis:     ICD-10-CM    1. Chronic left-sided low back pain with left-sided sciatica M54.42     G89.29          Assessment:     HEP/POC compliance is  good .    Patient showing good tolerance to MEDX, rotary torso, and HEP. Shows good kinesthetic awareness with exercises. Patient reporting improvements with nerve glides. He is progressing well through HEP thus far, has a physioball at home, may benefit from progressing core challenge on physioball for long term maintenance. Appropriate to continue with skilled PT per POC.    Goal Status:  Pt. will be independent with home exercise program in : 12 weeks    Pt will: decrease pain (at its worst) to a 3/10 following provoking activity or prolonged driving in 8 weeks  Pt will: increase strength on lumbar MedX by 30# in 8 weeks  Pt will: return to yoga and gym exercise routines without pain in 8 weeks      Plan / Patient Education:     Continue with initial plan of care.     Plan for next visit: MEDX DE, rotary torso, core stab. Possible physioball exercises.    Subjective:     Pain Ratin-3/10    Has had some pain on the other (right) side. Otherwise doing okay. Occasionally having the leg pain. Got in a lot of walking yesterday which helps.    Objective:     Patient has completed the 2nd week of MEDX program.    Educated on piriformis stretch to relieve potential nerve compression at hip.    Lumbar MEDX:  Enter Week / Visit #: W2 V2  Weight (lbs): 130#  Reps (#): 20  Time: 147  ROM (degrees): 0-51  Flex:Ext ratio: 2.06:1    Exercises:  Exercise #1: Tall kneeling hip flexor stretch; hold 30 seconds  Comment #1: Sciatic nerve glide, supine, x 10  Exercise #2: Treadmill x 4 minutes  Comment #2: Rotary  torso 90 seconds 42#'s started to the R  Exercise #3: Bridge X 10 Hold 5 seconds  Comment #3: TA Leg Extension X 10   Exercise #4: TA + 90/90 with march 10 x 2  Comment #4: Pilates pull down, supine 90/90 3 R 15x  Exercise #5: Pilates leg press all bands 20x  Comment #5: Pilates tall kneel pull down 2 R 10x  Exercise #6: Figure 4 piriformis stretch X 30 seconds  Comment #6: Pilates ab rollouts X 8  Exercise #7: Physioball ab rollouts X 5    Treatment Today     TREATMENT MINUTES COMMENTS   Evaluation     Self-care/ Home management     Manual therapy     Neuromuscular Re-education     Therapeutic Activity     Therapeutic Exercises 28    Gait training     Modality__________________                Total 28    Blank areas are intentional and mean the treatment did not include these items.       Keyur SINGER  6/21/2019

## 2021-05-29 NOTE — PATIENT INSTRUCTIONS - HE
[facility] Radiology Locations    Please call [phone] to schedule your image(s) (select option #1 and then #2). There are 3 different locations, see below. You can do walk-in visits for xray only images if you want.     [facility]  [address] [place] [id number]    [facility]   [address] [address]    [place] [address] [facility] [address]    Discussed the importance of core strengthening, ROM, stretching exercises with the patient and how each of these entities is important in decreasing pain.  Explained to the patient that the purpose of physical therapy is to teach the patient a home exercise program.  These exercises need to be performed every day in order to decrease pain and prevent future occurrences of pain.        ~Please call Nurse Navigation line [phone] with any questions or concerns about your treatment plan, if symptoms worsen and you would like to be seen urgently, or if you have problems controlling bladder and bowel function.  ~Follow Up Appointment time slots with Deann Nicole CNP with [facility], are also available at the [facility] location near [facility] on the first and third THURSDAY afternoons of each month.
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
## 2021-05-29 NOTE — PROGRESS NOTES
ASSESSMENT: Siva Rowan is a 33 y.o. male who presents for consultation at the request of [facility] PCP Joel Adrian MD, with a past medical history significant for knee pain, abnormal LFTs, had hepatic steatosis, class II obesity who presents today for new patient evaluation of:    -Chronic intermittent left low back pain at the belt line with left lumbar radiculopathy/paresthesias left anterior medial thigh that stops at the knee, L3 dermatomal pattern.    Patient is neurologically intact on exam. No myelopathic or red flag symptoms.     WILLIAM Score: 12    WHO 5: 20     Diagnoses and all orders for this visit:    Chronic left-sided low back pain with left-sided sciatica  -     XR Lumbar Spine 2 or 3 VWS; Future; Expected date: 06/07/2019  -     Ambulatory referral to PT/OT    Chronic left lumbar radiculopathy  -     XR Lumbar Spine 2 or 3 VWS; Future; Expected date: 06/07/2019  -     Ambulatory referral to PT/OT      PLAN:  Reviewed spine anatomy and disease process. Discussed diagnosis and treatment options with the patient today. A shared decision making model was used.  The patient's values and choices were respected. The following represents what was discussed and decided upon by the provider and the patient.      -DIAGNOSTIC TESTS:  Images were personally reviewed and interpreted and explained to patient today using spine model.   --Ordered lumbar spine x-ray to further evaluate chronic intermittent left low back pain/radiculopathy.  We discussed would be an option to get an MRI, patient is neurologically intact on exam therefore we could hold off and see how he does conservatively with PT which he prefers to do.  Discussed that if his symptoms flareup or he is not improving with PT I would recommend a lumbar spine MRI at that time.    -PHYSICAL THERAPY: Referral to physical therapy lumbar MedX program here at the spine center for a more intensive core strengthening as well as establishing home exercises for  core strengthening and nerve glides.  Discussed the importance of core strengthening, ROM, stretching exercises with the patient and how each of these entities is important in decreasing pain.  Explained to the patient that the purpose of physical therapy is to teach the patient a home exercise program.  These exercises need to be performed every day in order to decrease pain and prevent future occurrences of pain.        -MEDICATIONS: Patient is not interested in further medications at this time.  -Could consider gabapentin down the road if symptoms worsen however he prefers not to take this.  Discussed multiple medication options today with patient. Discussed risks, side effects, and proper use of medications. Patient verbalized understanding.    -INTERVENTIONS: No recommendations for injections at this time.  Discussed that if symptoms worsen down the road we could consider left ONIEL pending MRI review, patient is hopeful to avoid a cortisone injection however.  Discussed risks and benefits of injections with patient today.    -PATIENT EDUCATION:  45 minutes of total visit time was spent face to face with the patient today, greater than 50% of total time spent with patient was spent on counseling, education, and coordinating care.   -10 minutes spent outside of visit time, non-face-to-face time, reviewing chart.    -FOLLOW-UP:   Follow-up as needed if symptoms are worsening or not improving with physical therapy.    Advised patient to call the [facility] if symptoms worsen or you have problems controlling bladder and bowel function.   ______________________________________________________________________    SUBJECTIVE:  HPI:  Siva Rowan  Is a 33 y.o. male who presents today for new patient evaluation of low back pain that is chronic intermittent ongoing for many years left low back with some mild pain into the left anterior medial thigh with some into the lateral thigh.  More intense however is the  numbness and tingling and burning sensation he has into the lateral side is been ongoing for many years and he does notice a numbing sensation to the skin as well lateral thigh that stops at the knee.  Currently his pain is manageable at a 3/10 he reports that at times with prolonged activity his pain can get up to a 9/10, a 1 at its best.  He does report that it is somewhat manageable but he wants to get some type of conservative treatment to help with his symptoms.  Patient reports he is very active on a regular basis with sports and he tolerates it just fine for the most part with only occasional flareups.  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    Patient denies right leg symptoms, denies recent trips or falls or weakness in his lower extremity, denies balance changes.  Patient denies bowel or bladder loss control.    -Treatment to Date: No prior spinal surgery or spinal injection.  No prior physical therapy for back/leg symptoms    -Medications:  Medrol Dosepak prescribed 4/4/2019 PCP with benefit  CBD oil with good benefit generalized joint pain    Current Outpatient Medications on File Prior to Encounter   Medication Sig Dispense Refill     sertraline (ZOLOFT) 50 MG tablet Take 1 tablet (50 mg total) by mouth daily. 90 tablet 2     No current facility-administered medications on file prior to encounter.        No Known Allergies    No past medical history on file.     Patient Active Problem List   Diagnosis     Joint Pain, Localized In The Knee     Routine general medical examination at a health care facility     Smoking history     Abnormal LFTs     Hepatic steatosis     Impaired fasting glucose     Class 2 obesity due to excess calories without serious comorbidity with body mass index (BMI) of  35.0 to 35.9 in adult       No past surgical history on file.    Family History   Problem Relation Age of Onset     Diabetes Mother      No Medical Problems Sister      Heart disease Maternal Grandfather        Reviewed past medical, surgical, and family history with patient found on new patient intake packet located in [employer]MR [employer] tab.     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  Patient denies smoking history.  Patient does report occasional drinking, has a history of being a prior heavy drinker in the past but not current, patient does report history of marijuana use in the past but not current.    ROS: Positive for joint pain, muscle pain, sciatica, leg swelling, abdominal pain, diarrhea, headache.  Specifically negative for bowel/bladder dysfunction, balance changes, dizziness, foot drop, fevers, chills, appetite changes, nausea/vomiting, unexplained weight loss. Otherwise 13 systems reviewed are negative. Please see the patient's intake questionnaire from today for details.    OBJECTIVE:  /67 (Patient Site: Right Arm, Patient Position: Sitting)   Pulse 74   Wt (!) 259 lb (117.5 kg)   SpO2 97%   BMI 32.16 kg/m      PHYSICAL EXAMINATION:    --CONSTITUTIONAL:  Vital signs as above.  No acute distress.  The patient is well nourished and well groomed.  --PSYCHIATRIC:  Appropriate mood and affect. The patient is awake, alert, oriented to person, place, time and answering questions appropriately with clear speech.    --SKIN:  Skin over the face, bilateral lower extremities, and posterior torso is clean, dry, intact without rashes.    --RESPIRATORY: Normal rhythm and effort. No abnormal accessory muscle breathing patterns noted.   --STANDING EXAMINATION:  Normal lumbar lordosis noted, no lateral shift.  --MUSCULOSKELETAL: Lumbar spine inspection reveals no evidence of deformity. Range of motion is not limited in lumbar flexion, extension, lateral rotation. No tenderness to palpation lumbar  spine. Straight leg raising in the supine position is negative to radicular pain on the right and positive on the left. Sciatic notch non-tender.  --SACROILIAC JOINT: Negative distraction.  Negative Savannah's with reproduction of pain to affected extremity, however some tightness with this maneuver on the left. One Finger point test negative.  --GROSS MOTOR: Gait is non-antalgic. Easily arises from a seated position. Toe walking and heel walking are normal without significant difficulty.    --LOWER EXTREMITY MOTOR TESTING:  Plantar flexion left 5/5, right 5/5   Dorsiflexion left 5/5, right 5/5   Great toe MTP extension left 5/5, right 5/5  Knee flexion left 5/5, right 5/5  Knee extension left 5/5, right 5/5   Hip flexion left 5/5, right 5/5  Hip abduction left 5/5, right 5/5  Hip adduction left 5/5, right 5/5   --HIPS: Full range of motion bilaterally. Negative FABERs on the involved lower extremity.   --NEUROLOGICAL:  2/4 patellar, medial hamstring, and achilles reflexes bilaterally.  Sensation to light touch is intact on the right, left sensory deficit lateral thigh. Babinski is negative. No clonus.  Negative Alicia reflex bilaterally.  --VASCULAR:  2/4 dorsalis pedis and posterior tibialsi pulses bilaterally.  Bilateral lower extremities are warm.  There is no pitting edema of the bilateral lower extremities.    RESULTS: Prior medical records from [facility] 1/4/2019 to current and care everywhere were reviewed today.    Imaging: No results found.

## 2021-05-29 NOTE — PROGRESS NOTES
[facility] Daily Progress     Patient Name: Siva Rowan  Date: 2019  Visit #: 3/12 per POC  PTA visit #:  -  Referral Diagnosis:   Chronic left-sided low back pain with left-sided sciatica  Referring provider: Deann Nicole C*  Visit Diagnosis:     ICD-10-CM    1. Chronic left-sided low back pain with left-sided sciatica M54.42     G89.29          Assessment:     HEP/POC compliance is  good .    Patient showing good tolerance to MEDX, rotary torso, and HEP. Shows good kinesthetic awareness with exercises. Patient reporting improvements with nerve glides. Appropriate to continue with skilled PT per POC.    Goal Status:  Pt. will be independent with home exercise program in : 12 weeks    Pt will: decrease pain (at its worst) to a 3/10 following provoking activity or prolonged driving in 8 weeks  Pt will: increase strength on lumbar MedX by 30# in 8 weeks  Pt will: return to yoga and gym exercise routines without pain in 8 weeks      Plan / Patient Education:     Continue with initial plan of care.     Plan for next visit: MEDX DE, rotary torso, core stab- hip stretching.     Subjective:     Pain Ratin-3/10    Back on yoga, hadn't done it for a while.  Does do planks, weight machines.  He has trouble with side-planks.    Objective:     Patient is in the 2nd week of MEDX program.    Good TA setting.    Lumbar MEDX:  Enter Week / Visit #: W2 V1  Weight (lbs): 127#  Reps (#): 17  Time: 147  ROM (degrees): 0-51  Flex:Ext ratio: 2.06:1    Exercises:  Exercise #1: Tall kneeling hip flexor stretch; hold 30 seconds  Comment #1: Sciatic nerve glide, supine, x 10  Exercise #2: Treadmill x 4 minutes  Comment #2: Rotary torso 90 seconds 38#'s started to the L  Exercise #3: Bridge X 10 Hold 5 seconds  Comment #3: TA Leg Extension X 10   Exercise #4: TA + 90/90 with march 10 x 2  Comment #4: Pilates pull down, supine 90/90 3 R 15x  Exercise #5: Pilates leg press all bands 20x  Comment #5: Pilates  tall kneel pull down 2 R 10x    Treatment Today     TREATMENT MINUTES COMMENTS   Evaluation     Self-care/ Home management     Manual therapy     Neuromuscular Re-education     Therapeutic Activity     Therapeutic Exercises 27    Gait training     Modality__________________                Total 27    Blank areas are intentional and mean the treatment did not include these items.       Mary Perez  6/17/2019

## 2021-05-29 NOTE — TELEPHONE ENCOUNTER
Refill Request  Did you contact pharmacy: No.  Patient was informed to call the pharmacy.  Medication name:   Requested Prescriptions     Pending Prescriptions Disp Refills     sertraline (ZOLOFT) 50 MG tablet 30 tablet 2     Sig: Take 1 tablet (50 mg total) by mouth daily.     Who prescribed the medication: Joel Adrian MD   [facility]  Is patient out of medication: Yes  Patient notified refills processed in 72 hours:  yes  Okay to leave a detailed message: no

## 2021-05-29 NOTE — PROGRESS NOTES
[facility] Daily Progress     Patient Name: Siva Rowan  Date: 2019  Visit #:  per POC  PTA visit #:  -  Referral Diagnosis:   Chronic left-sided low back pain with left-sided sciatica  Referring provider: Deann Nicole C*  Visit Diagnosis:     ICD-10-CM    1. Chronic left-sided low back pain with left-sided sciatica M54.42     G89.29          Assessment:     HEP/POC compliance is  good .    Siva is progressing well with MedX, rotary torso, core strengthening exercises and HEP.  He has good kinesthetic awareness with exercises though ball planks were very challenging today. Pt compliant and has a physioball at home, may benefit from progressing core challenge on physioball for long term maintenance. Appropriate to continue with skilled PT per POC.    Goal Status:  Pt. will be independent with home exercise program in : 12 weeks    Pt will: decrease pain (at its worst) to a 3/10 following provoking activity or prolonged driving in 8 weeks  Pt will: increase strength on lumbar MedX by 30# in 8 weeks  Pt will: return to yoga and gym exercise routines without pain in 8 weeks      Plan / Patient Education:     Continue with initial plan of care.     Plan for next visit: MEDX DE, rotary torso, core stab. Progress physioball exercises for HEP    Subjective:     Pain Ratin-3/10  After last Friday's PT and softball he had a lot of quad/hamstring/groin hamstring. Not sure if it was just more activity, back has been feeling okay.    Objective:     Patient is in 3rd week of MEDX program.    Pt had difficulty with ball roll outs, many cues for form but this was still difficult by the end of session.    Lumbar MEDX:  Enter Week / Visit #: Wk 3 V 1  Weight (lbs): 132#  Reps (#): 18  Time: 146  ROM (degrees): 0-51  Flex:Ext ratio: 2.06:1    Exercises:  Exercise #1: Tall kneeling hip flexor stretch; hold 30 seconds  Comment #1: Sciatic nerve glide, supine, x 10  Exercise #2: Treadmill x 4  "minutes  Comment #2: Rotary torso 90 seconds 50#'s started to the R  Exercise #3: Bridge X 10 Hold 5 seconds  Comment #3: TA Leg Extension X 10   Exercise #4: TA + 90/90 with march 10 x 2  Comment #4: Pilates pull down, supine 90/90 3 R 15x  Exercise #5: Pilates leg press all bands 20x  Comment #5: Pilates tall kneel pull down 3R 10x  Exercise #6: Figure 4 piriformis stretch X 30 seconds  Comment #6: Pilates ab rollouts X NP today  Exercise #7: Physioball ab rollouts X 5 DIFFICULT   Comment #7: Bridge on ball 5 x 10\"        Treatment Today     TREATMENT MINUTES COMMENTS   Evaluation     Self-care/ Home management     Manual therapy     Neuromuscular Re-education     Therapeutic Activity     Therapeutic Exercises 28    Gait training     Modality__________________                Total 28    Blank areas are intentional and mean the treatment did not include these items.       Mary Perez  6/24/2019  "

## 2021-05-29 NOTE — TELEPHONE ENCOUNTER
----- Message from Deann Nicole CNP sent at 6/7/2019 10:57 AM CDT -----  Please call patient and notify him that I did review his lumbar x-ray that was completed today after our visit.  It does show arthritis/arthropathy at L4-5 and L5-S1 however otherwise alignment looks good and disc height also looks good.  Recommend MedX program as ordered and follow-up if symptoms are not improving.

## 2021-05-29 NOTE — TELEPHONE ENCOUNTER
Phone call to patient to review results and provider's recommendations. Results given and explained. Explained that he should begin the ordered MedX PT program. He should return to clinic if he fails to improve. Stated understanding. He understands the [facility] schedulers will be contacting him to set this up.

## 2021-05-30 VITALS — BODY MASS INDEX: 37.12 KG/M2 | WEIGHT: 297 LBS

## 2021-05-30 VITALS — HEIGHT: 75 IN | BODY MASS INDEX: 36.56 KG/M2 | WEIGHT: 294 LBS

## 2021-05-30 NOTE — TELEPHONE ENCOUNTER
PT called and spoke with the patient regarding NS. He overslept due to allergies and was sorry to miss appointment. PT did offer other openings in the day but they did not work out with the patients' schedule. PT reminded the patient of his next appointment.

## 2021-05-30 NOTE — PROGRESS NOTES
"  [facility] Daily Progress     Patient Name: Siva Rowan  Date: 7/3/2019  Visit #:  per POC  PTA visit #:  -  Referral Diagnosis:   Chronic left-sided low back pain with left-sided sciatica  Referring provider: Deann Nicole C*  Visit Diagnosis:     ICD-10-CM    1. Chronic left-sided low back pain with left-sided sciatica M54.42     G89.29          Assessment:     HEP/POC compliance is  good .    Siva is progressing well with MedX, rotary torso, core strengthening exercises and HEP.  He has good kinesthetic awareness with exercises and higher level core stability exercises have improved. Pt compliant and has a physioball at home, may benefit from progressing core challenge on physioball for long term maintenance. Appropriate to continue with skilled PT per POC.    Goal Status:  Pt. will be independent with home exercise program in : 12 weeks    Pt will: decrease pain (at its worst) to a 3/10 following provoking activity or prolonged driving in 8 weeks  Pt will: increase strength on lumbar MedX by 30# in 8 weeks  Pt will: return to yoga and gym exercise routines without pain in 8 weeks      Plan / Patient Education:     Continue with initial plan of care.     Plan for next visit: MEDX DE, rotary torso, core stab.    Subjective:     Pain Ratin-3/10    Back is \"not too bad\".  Softball is good. Driving might be getting a little better.    Objective:     Patient is in 3rd week of MEDX program.    Much improved form during ball roll outs    Lumbar MEDX:  Enter Week / Visit #: Wk 3 V 2  Weight (lbs): 136#  Reps (#): 22  Time: 148  ROM (degrees): 0-51  Flex:Ext ratio: 2.06:1    Exercises:  Exercise #1: Tall kneeling hip flexor stretch; hold 30 seconds  Comment #1: Sciatic nerve glide, supine, x 10  Exercise #2: Treadmill x 4 minutes  Comment #2: Rotary torso 90 seconds 54# started to L  Exercise #3: Bridge X 10 Hold 5 seconds  Comment #3: TA Leg Extension X 10   Exercise #4: TA + 90/90 " "with march 10 x 2  Comment #4: Pilates pull down, supine 90/90 3 R 15x  Exercise #5: Pilates leg press all bands 20x  Comment #5: Pilates tall kneel pull down 3R 10x  Exercise #6: Figure 4 piriformis stretch X 30 seconds  Comment #6: Pilates ab rollouts X NP today  Exercise #7: Physioball ab rollouts X 5 10\" much improved  Comment #7: Bridge on ball 5 x 10\"        Treatment Today     TREATMENT MINUTES COMMENTS   Evaluation     Self-care/ Home management     Manual therapy     Neuromuscular Re-education     Therapeutic Activity     Therapeutic Exercises 28    Gait training     Modality__________________                Total 28    Blank areas are intentional and mean the treatment did not include these items.       Mary Perez  7/3/2019  "

## 2021-05-30 NOTE — PROGRESS NOTES
[facility] Daily Progress     Patient Name: Siva Rowan  Date: 2019  Visit #:  per POC  PTA visit #:  -  Referral Diagnosis:   Chronic left-sided low back pain with left-sided sciatica  Referring provider: Deann Nicole C*  Visit Diagnosis:     ICD-10-CM    1. Chronic left-sided low back pain with left-sided sciatica M54.42     G89.29          Assessment:     HEP/POC compliance is  good .    Siva is progressing well with MedX, rotary torso, core strengthening exercises and HEP.  He has good kinesthetic awareness with exercises and higher level core stability exercises have improved. Pt compliant and has a physioball at home, may benefit from progressing core challenge on physioball for long term maintenance.     He is reporting that work is getting easier for him, feels he has learned how to better manage when he starts to get tight. Patient will be ready for 4 week re-test next session. Appropriate to continue with skilled PT per POC.    Goal Status:  Pt. will be independent with home exercise program in : 12 weeks    Pt will: decrease pain (at its worst) to a 3/10 following provoking activity or prolonged driving in 8 weeks  Pt will: increase strength on lumbar MedX by 30# in 8 weeks  Pt will: return to yoga and gym exercise routines without pain in 8 weeks      Plan / Patient Education:     Continue with initial plan of care.     Plan for next visit: MEDX DE, rotary torso, core stab. 4 week re-rest.    Subjective:     Pain Ratin-3/10    Back is feeling good today. No big issues. Finding it is easier to stay loose throughout the day. Not waking up sore as many mornings. He has started getting back into the yoga stuff some. Has been starting to think about starting to do some free weights soon.    Objective:     Patient is in 4th week of MEDX program.    Much improved form during ball roll outs   More challenge noted with reformer ab rollouts.    Lumbar MEDX:  Enter Week /  "Visit #: Wk 4 V 1  Weight (lbs): 142#  Reps (#): 22  Time: 161  ROM (degrees): 0-51  Flex:Ext ratio: 2.06:1    Exercises:  Exercise #1: Tall kneeling hip flexor stretch; hold 30 seconds  Comment #1: Sciatic nerve glide, supine, x 10  Exercise #2: Treadmill x 4 minutes  Comment #2: Rotary torso 90 seconds 56# started to R  Exercise #3: Bridge X 10 Hold 5 seconds  Comment #3: TA Leg Extension X 10   Exercise #4: TA + 90/90 with march 10 x 2  Comment #4: Pilates pull down, supine 90/90 3 R 15x  Exercise #5: Pilates leg press all bands 20x  Comment #5: Pilates tall kneel pull down 3R 10x  Exercise #6: Figure 4 piriformis stretch X 30 seconds  Comment #6: Pilates ab rollouts X NP today  Exercise #7: Physioball ab rollouts X 5 10\" much improved  Comment #7: Bridge on ball 5 x 10\"  Exercise #8: Reformer abductions X 10 2R 1B        Treatment Today     TREATMENT MINUTES COMMENTS   Evaluation     Self-care/ Home management     Manual therapy     Neuromuscular Re-education     Therapeutic Activity     Therapeutic Exercises 26    Gait training     Modality__________________                Total 26    Blank areas are intentional and mean the treatment did not include these items.       Keyur SINGER  7/5/2019  "

## 2021-05-30 NOTE — PROGRESS NOTES
[facility] Daily Progress     Patient Name: Siva Rowan  Date: 2019  Visit #:  per POC  Referral Diagnosis:   Chronic left-sided low back pain with left-sided sciatica  Referring provider: Deann Nicole C*  Visit Diagnosis:     ICD-10-CM    1. Chronic left-sided low back pain with left-sided sciatica M54.42     G89.29        Assessment:     HEP/POC compliance is  good .    Siva is progressing well with MedX, rotary torso, core strengthening exercises and HEP.  He has good kinesthetic awareness with exercises and higher level core stability exercises have improved. Pt compliant and has a physioball at home, has yet to use this for HEP, but plans to start implementing this soon.    He continues to be able to tolerate more weight on the lumbar MedX and rotary torso while maintaining the goal number of repetitions. He continues progressing with the reformer and physioball exercises in the clinic.    He is reporting that even after rolling his ankle this past weekend he felt okay and thought he was able to recover better since he has been doing strengthening in PT and feels he has learned how to better manage when he starts to get tight.. Appropriate to continue with skilled PT per POC, another 4-8 weeks at 1X/week per MEDX protocol.    Goal Status:  Pt. will be independent with home exercise program in : 12 weeks Met  Pt will: decrease pain (at its worst) to a 3/10 following provoking activity or prolonged driving in 8 weeks,  Had one time of sharp pain in the leg 5/10. Otherwise 2-3 generally.  Pt will: increase strength on lumbar MedX by 30# in 8 weeks Met  Pt will: return to yoga and gym exercise routines without pain in 8 weeks Working towards.      Plan / Patient Education:     Continue with initial plan of care.     Plan for next visit: MEDX DE, rotary torso, core stab.     Subjective:     Pain Ratin/10    He rolled his ankle and stepped in a hole this weekend while  "playing ball and said that hurt but feels like he would've been worse off if he hadn't been doing PT and the strengthening program. He notes continued soreness. He has been doing some yoga stuff that Duy has taught him in the clinic.    Objective:     Patient has completed the 5th week of the MEDX program.  PT introduced physio ball squats against the wall and used tactile and verbal cues for proper form, keeping the toes over or behind the knees and engaging the core throughout the squat.    MED X Testing Lumbar Initial 6/11/19 4 week re-test 7/12/19   AROM (full ROM 0-72) 0-51 0-60   Max Torque  258# 355#   Avg Torque  190# 270#   Flex/ext Ratio (ideal 1.4:1) 2.06:1 1.98:1     Straight Leg Raise (R/L)  85/85 (was 60/45 on initial evaluation)    Lumbar MEDX:  Enter Week / Visit #: Wk 5  Weight (lbs): 165#  Reps (#): 16  Time: 144  ROM (degrees): 0-51  Flex:Ext ratio: 1.98:1    Exercises:  Exercise #1: Tall kneeling hip flexor stretch; hold 30 seconds  Comment #1: Sciatic nerve glide, supine, x 10  Exercise #2: Treadmill x 4 minutes  Comment #2: Rotary torso 90 seconds 60# started to R  Exercise #3: Bridge X 10 Hold 5 seconds  Comment #3: TA Leg Extension X 10   Exercise #4: TA + 90/90 with march 10 x 2  Comment #4: Pilates pull down, supine 90/90 3 R 17x  Exercise #5: Pilates leg press all bands 20x  Comment #5: Pilates tall kneel pull down 3R 10x  Exercise #6: Figure 4 piriformis stretch X 30 seconds  Comment #6: Pilates ab rollouts X NP today  Exercise #7: Physioball ab rollouts X 5 10\" much improved  Comment #7: Bridge on ball 5 x 10\"  Exercise #8: Reformer abductions X 12 2R 1B  Comment #8: Maurizio Pose  Exercise #9: Physioball wall squats x10      Treatment Today     TREATMENT MINUTES COMMENTS   Evaluation     Self-care/ Home management     Manual therapy     Neuromuscular Re-education     Therapeutic Activity     Therapeutic Exercises 25 Review and progression of HEP.  Added physio ball squats (in-clinic " only).   Gait training     Modality__________________     Physical Performance Testing           Total 25    Blank areas are intentional and mean the treatment did not include these items.     PT  present for and directed all treatment and not engaged in treating other patients for the duration of this appointment.    Annie Garner, SPT  Mary Luna, PT, DPT  7/16/2019

## 2021-05-30 NOTE — PROGRESS NOTES
[facility] Daily Progress     Patient Name: Siva Rowan  Date: 2019  Visit #: 10/12 per POC  Referral Diagnosis:   Chronic left-sided low back pain with left-sided sciatica  Referring provider: Deann Nicole C*  Visit Diagnosis:     ICD-10-CM    1. Chronic left-sided low back pain with left-sided sciatica M54.42     G89.29        Assessment:     HEP/POC compliance is  good .    Siva is progressing well with MedX, rotary torso, core strengthening exercises and HEP.  He has good kinesthetic awareness with exercises and higher level core stability exercises have improved. He is doing his yoga stretching and some strengthening at home. Has been working on physioball exercises as well.    Patient continues to report some numbness in the anterior thigh. Addressed today with femoral N sliders and prone quad stretching. He does feel overall that his is improving some.     Appropriate to continue with skilled PT per POC, another 4-8 weeks at 1X/week per MEDX protocol.    Goal Status:  Pt. will be independent with home exercise program in : 12 weeks Met  Pt will: decrease pain (at its worst) to a 3/10 following provoking activity or prolonged driving in 8 weeks,  Had one time of sharp pain in the leg 5/10. Otherwise 2-3 generally.  Pt will: increase strength on lumbar MedX by 30# in 8 weeks Met  Pt will: return to yoga and gym exercise routines without pain in 8 weeks Working towards.      Plan / Patient Education:     Continue with initial plan of care.     Plan for next visit: MEDX DE, rotary torso, core stab.     Subjective:     Pain Ratin/10    Feeling good with the back. Having a little soreness in the back this AM, but this is from having a massage on Wednesday. Still having some tingling in the thigh, but doesn't notice as often.    Objective:     Patient has completed the 6th week of the MEDX program.    Addition of femoral N Sliders and prone quad stretch to HEP.   L quad less  "flexibility than R noted with this.    MED X Testing Lumbar Initial 6/11/19 4 week re-test 7/12/19   AROM (full ROM 0-72) 0-51 0-60   Max Torque  258# 355#   Avg Torque  190# 270#   Flex/ext Ratio (ideal 1.4:1) 2.06:1 1.98:1     Straight Leg Raise (R/L)  85/85 (was 60/45 on initial evaluation)    Lumbar MEDX:  Enter Week / Visit #: Wk 5  Weight (lbs): 165#  Reps (#): 16  Time: 144  ROM (degrees): 0-51  Flex:Ext ratio: 1.98:1    Exercises:  Exercise #1: Tall kneeling hip flexor stretch; hold 30 seconds  Comment #1: Sciatic nerve glide, supine, x 10  Exercise #2: Treadmill x 4 minutes  Comment #2: Rotary torso 90 seconds 60# started to R  Exercise #3: Bridge X 10 Hold 5 seconds  Comment #3: TA Leg Extension X 10   Exercise #4: TA + 90/90 with march 10 x 2  Comment #4: Pilates pull down, supine 90/90 3 R 17x  Exercise #5: Pilates leg press all bands 20x  Comment #5: Pilates tall kneel pull down 3R 10x  Exercise #6: Figure 4 piriformis stretch X 30 seconds  Comment #6: Pilates ab rollouts X NP today  Exercise #7: Physioball ab rollouts X 5 10\" much improved  Comment #7: Bridge on ball 5 x 10\"  Exercise #8: Reformer abductions X 12 2R 1B  Comment #8: Maurizio Pose  Exercise #9: Physioball wall squats x10      Treatment Today     TREATMENT MINUTES COMMENTS   Evaluation     Self-care/ Home management     Manual therapy     Neuromuscular Re-education     Therapeutic Activity     Therapeutic Exercises 26 Review and progression of HEP.   Gait training     Modality__________________     Physical Performance Testing           Total 26    Blank areas are intentional and mean the treatment did not include these items.     Keyur SINGER, PT, DPT  7/19/2019  "

## 2021-05-30 NOTE — PROGRESS NOTES
[facility] Daily Progress     Patient Name: Siva Rowan  Date: 2019  Visit #:  per POC  PTA visit #:  -  Referral Diagnosis:   Chronic left-sided low back pain with left-sided sciatica  Referring provider: Deann Nicole C*  Visit Diagnosis:     ICD-10-CM    1. Chronic left-sided low back pain with left-sided sciatica M54.42     G89.29          Assessment:     HEP/POC compliance is  good .    Siva is progressing well with MedX, rotary torso, core strengthening exercises and HEP.  He has good kinesthetic awareness with exercises and higher level core stability exercises have improved. Pt compliant and has a physioball at home, has yet to use this for HEP, but plans to start implementing this soon.    He is showing good improvements with his 4 week re-testing today. His lumbar MEDX strength is now above average through 1/2 of his motion, being slightly below average in the extended portion of his ROM. His ROM on MEDX has improved. This is likely attributed to improvements in his hamstring mobility, seen with SLR ROM increase.    He is reporting that work is getting easier for him, feels he has learned how to better manage when he starts to get tight.. Appropriate to continue with skilled PT per POC, another 4-8 weeks at 1X/week per MEDX protocol.    Goal Status:  Pt. will be independent with home exercise program in : 12 weeks Met  Pt will: decrease pain (at its worst) to a 3/10 following provoking activity or prolonged driving in 8 weeks,  Had one time of sharp pain in the leg 5/10. Otherwise 2-3 generally.  Pt will: increase strength on lumbar MedX by 30# in 8 weeks Met  Pt will: return to yoga and gym exercise routines without pain in 8 weeks Working towards.      Plan / Patient Education:     Continue with initial plan of care.     Plan for next visit: MEDX DE, rotary torso, core stab.     Subjective:     Pain Ratin-3/10    Soreness is on both sides now, like a tightness  "that he is working the area, previously was a general soreness that would be a couple of debilitating days. Starting to know that he is just going to have to start exercising just on a regular basis to feel good.     Objective:     Patient has completed the 4th week of the MEDX program.    MED X Testing Lumbar Initial 6/11/19 4 week re-test 7/12/19   AROM (full ROM 0-72) 0-51 0-60   Max Torque  258# 355#   Avg Torque  190# 270#   Flex/ext Ratio (ideal 1.4:1) 2.06:1 1.98:1     Straight Leg Raise (R/L)  85/85 (was 60/45 on initial evaluation)    Lumbar MEDX:  Enter Week / Visit #: Wk 4 V 2  Weight (lbs): 155#  Reps (#): 17  Time: 142  ROM (degrees): 0-51  Flex:Ext ratio: 2.06:1    Exercises:  Exercise #1: Tall kneeling hip flexor stretch; hold 30 seconds  Comment #1: Sciatic nerve glide, supine, x 10  Exercise #2: Treadmill x 4 minutes  Comment #2: Rotary torso 90 seconds 58# started to L  Exercise #3: Bridge X 10 Hold 5 seconds  Comment #3: TA Leg Extension X 10   Exercise #4: TA + 90/90 with march 10 x 2  Comment #4: Pilates pull down, supine 90/90 3 R 15x  Exercise #5: Pilates leg press all bands 20x  Comment #5: Pilates tall kneel pull down 3R 10x  Exercise #6: Figure 4 piriformis stretch X 30 seconds  Comment #6: Pilates ab rollouts X NP today  Exercise #7: Physioball ab rollouts X 5 10\" much improved  Comment #7: Bridge on ball 5 x 10\"  Exercise #8: Reformer abductions X 10 2R 1B  Comment #8: Maurizio Pose        Treatment Today     TREATMENT MINUTES COMMENTS   Evaluation     Self-care/ Home management     Manual therapy     Neuromuscular Re-education     Therapeutic Activity     Therapeutic Exercises 18 Review and progression of HEP.   Gait training     Modality__________________     Physical Performance Testing 9 MEDX and mobility re-testing.         Total 27    Blank areas are intentional and mean the treatment did not include these items.       Keyur SINGER, PT, DPT  7/12/2019  "

## 2021-05-31 VITALS — BODY MASS INDEX: 37.37 KG/M2 | WEIGHT: 299 LBS

## 2021-06-01 VITALS — BODY MASS INDEX: 37.61 KG/M2 | HEIGHT: 76 IN | WEIGHT: 308.9 LBS

## 2021-06-02 VITALS — WEIGHT: 269 LBS | BODY MASS INDEX: 33.4 KG/M2

## 2021-06-02 VITALS — HEIGHT: 75 IN | BODY MASS INDEX: 35.06 KG/M2 | WEIGHT: 282 LBS

## 2021-06-03 VITALS — BODY MASS INDEX: 32.16 KG/M2 | WEIGHT: 259 LBS

## 2021-06-04 VITALS
HEART RATE: 67 BPM | SYSTOLIC BLOOD PRESSURE: 104 MMHG | DIASTOLIC BLOOD PRESSURE: 60 MMHG | HEIGHT: 75 IN | BODY MASS INDEX: 32.7 KG/M2 | WEIGHT: 263 LBS | OXYGEN SATURATION: 97 %

## 2021-06-05 VITALS
WEIGHT: 280 LBS | OXYGEN SATURATION: 97 % | SYSTOLIC BLOOD PRESSURE: 142 MMHG | DIASTOLIC BLOOD PRESSURE: 80 MMHG | HEART RATE: 78 BPM | BODY MASS INDEX: 34.1 KG/M2 | TEMPERATURE: 97.5 F | HEIGHT: 76 IN

## 2021-06-05 NOTE — PROGRESS NOTES
Assessment/Plan:     1. Routine general medical examination at a health care facility  Routine healthcare maintenance.  Preventative cares reviewed.  Seasonal flu shot immunization provided.  Immunizations reviewed and otherwise up-to-date.    2. Class 1 obesity due to excess calories without serious comorbidity with body mass index (BMI) of 32.0 to 32.9 in adult  Dietary and exercise modification for weight goal less than 250 pounds initially, less than 240 pounds ideally.    3. Anxiety disorder, unspecified type  Patient has been on sertraline 50 mg daily.  AURELIO 7 questionnaire 2 out of 21 and PHQ 9 questionnaire 2 out of 27.  Due to diarrhea will discontinue sertraline to see if improvement.  Otherwise consider initiating venlafaxine extended release 75 mg daily for anxiety management if worsening symptoms of anxiety noted.    4. Diarrhea, unspecified type  Diarrhea likely multifactorial.  Lab evaluation.  Diagnostic colonoscopy with family history of ulcerative colitis in maternal uncle.  Discontinue sertraline which may be contributing.  Notify with update in the next 2 to 4 weeks to ensure resolved issues with diarrhea.  - Comprehensive Metabolic Panel  - HM2(CBC w/o Differential)  - Lipase  - Ambulatory referral for Colonoscopy    5. Family history of ulcerative colitis  Maternal family history of ulcerative colitis and uncle.    6. Impaired fasting glucose  Impaired fasting glucose.  Check A1c today, nonfasting.  - Glycosylated Hemoglobin A1c    7. Hepatic steatosis  Ensure resolution of LFT elevation with history of hepatic steatosis on prior abdominal ultrasound.  - Comprehensive Metabolic Panel       The following high BMI interventions were performed this visit: encouragement to exercise, weight monitoring, weight loss from baseline weight and lifestyle education regarding diet.  Ensure ongoing efforts to achieve weight goal < 250 pounds initially, < 240 pounds ideally.              Subjective:       Siva Rowan is a 34 y.o. male who presents for an annual exam.  In general doing well.  Fostering 2 of his sisters children currently and may continue to raise them as needed.  Anxiety concerns treated with sertraline.  In general doing okay in this regard however has had ongoing diarrhea.  Notes maternal family history of ulcerative colitis and uncle.  Patient has had hepatic steatosis and elevated TG elevation historically.  No fevers or chills.  Denies recent illness.  Comprehensive review of systems as above otherwise all negative.    Engaged - Connie x 12/2006  1 son - 14  1 daughter - 9  Fostering his nephews (age 5 and 6)  Tobacco: none (quit ~ 2014)  EtOH: occ  Mom - mild diabetes  Dad - lung CA (smoker); heart stents  2 younger sis -   Maternal uncle with ulcerative colitis  Surgeries: vasectomy 3/31/17  Hospitalizations: none   Work: auto glass installation  Hobbies:  [employer]: football, baseball; plays softball    Healthy Habits:   Regular Exercise: Yes  Healthy Diet: Yes  Dental Visits Regularly: Yes  Seat Belt: Yes   Sexually active: Yes  Colonoscopy: No  Lipid Profile: Yes  Glucose Screen: Yes    Immunization History   Administered Date(s) Administered     Influenza,seasonal,quad inj =/> 6months 02/08/2017     Tdap 08/08/2016     Immunization status: up to date and documented, needs flu shot.  Vision Screening:both eyes  Hearing: PASS     Current Outpatient Medications   Medication Sig Dispense Refill     sertraline (ZOLOFT) 50 MG tablet Take 1 tablet (50 mg total) by mouth daily. 90 tablet 2     No current facility-administered medications for this visit.      History reviewed. No pertinent past medical history.  History reviewed. No pertinent surgical history.  Patient has no known allergies.  Family History   Problem Relation Age of Onset     Diabetes Mother      No Medical Problems Sister      Heart disease Maternal Grandfather      Social History     Socioeconomic  "History     Marital status: Single     Spouse name: Not on file     Number of children: Not on file     Years of education: Not on file     Highest education level: Not on file   Occupational History     Not on file   Social Needs     Financial resource strain: Not on file     Food insecurity:     Worry: Not on file     Inability: Not on file     Transportation needs:     Medical: Not on file     Non-medical: Not on file   Tobacco Use     Smoking status: Former Smoker     Packs/day: 0.75     Years: 15.00     Pack years: 11.25     Last attempt to quit: 2015     Years since quittin.0     Smokeless tobacco: Never Used   Substance and Sexual Activity     Alcohol use: Yes     Drug use: No     Sexual activity: Not on file   Lifestyle     Physical activity:     Days per week: Not on file     Minutes per session: Not on file     Stress: Not on file   Relationships     Social connections:     Talks on phone: Not on file     Gets together: Not on file     Attends Mosque service: Not on file     Active member of club or organization: Not on file     Attends meetings of clubs or organizations: Not on file     Relationship status: Not on file     Intimate partner violence:     Fear of current or ex partner: Not on file     Emotionally abused: Not on file     Physically abused: Not on file     Forced sexual activity: Not on file   Other Topics Concern     Not on file   Social History Narrative     Not on file       Review of Systems  Comprehensive ROS: as above, otherwise all negative.           Objective:     /60   Pulse 67   Ht 6' 3.25\" (1.911 m)   Wt (!) 263 lb (119.3 kg)   SpO2 97%   BMI 32.65 kg/m    Body mass index is 32.65 kg/m .    Physical    General Appearance:    Alert, cooperative, no distress, appears stated age.  Obesity.   Head:    Normocephalic, without obvious abnormality, atraumatic   Eyes:    PERRL, conjunctiva/corneas clear, EOM's intact, fundi     benign, both eyes        Ears:    Normal " TM's and external ear canals, both ears   Nose:   Nares normal, septum midline, mucosa normal, no drainage    or sinus tenderness   Throat:   Lips, mucosa, and tongue normal; teeth and gums normal   Neck:   Supple, symmetrical, trachea midline, no adenopathy;        thyroid:  No enlargement/tenderness/nodules; no carotid    bruit or JVD   Back:     Symmetric, no curvature, ROM normal, no CVA tenderness   Lungs:     Clear to auscultation bilaterally, respirations unlabored   Chest wall:    No tenderness or deformity   Heart:    Regular rate and rhythm, S1 and S2 normal, no murmur, rub   or gallop   Abdomen:     Soft, non-tender, bowel sounds active all four quadrants,     no masses, no organomegaly.     Genitalia:    Normal male without lesion, discharge or tenderness.  No inguinal hernia noted.     Rectal:    deferred   Extremities:   Extremities normal, atraumatic, no cyanosis or edema   Pulses:   2+ and symmetric all extremities   Skin:   Skin color, texture, turgor normal, no rashes or lesions   Lymph nodes:   Cervical, supraclavicular, and axillary nodes normal   Neurologic:   CNII-XII intact. Normal strength, sensation and reflexes       throughout           US ABDOMEN LIMITED  6/16/2018 9:10 AM     INDICATION: Elevated liver function tests.  COMPARISON: None.     FINDINGS:  GALLBLADDER: Normal, without cholelithiasis.  BILE DUCTS: No bile duct dilation. The common bile duct measures 4 mm.  LIVER: Diffusely echogenic consistent with fatty infiltration.  RIGHT KIDNEY: No hydronephrosis.  PANCREAS: Not imaged in detail on this limited study. No incidental abnormalities.     No ascites in the right upper quadrant.     IMPRESSION:   CONCLUSION:  1.  Fatty liver.  2.  Exam otherwise negative.        This note has been dictated using voice recognition software and as a result may contain minor grammatical errors and unintended word substitutions.

## 2021-06-08 NOTE — PROGRESS NOTES
Assessment:     1. Routine general medical examination at a health care facility  Influenza, Seasonal,Quad Inj, 36+ MOS   2. Non morbid obesity due to excess calories  Glycosylated Hemoglobin A1c   3. Skin tag     4. Encounter for vasectomy counseling     5. Hypercholesteremia  HDL Cholesterol    Cholesterol, Total        Plan:      Routine healthcare maintenance.  Preventative cares reviewed.  We'll check total and HDL cholesterol today for ratio less than for ideally.  Check A1c for diabetes screen.  Flu shot provided.  Immunizations otherwise up-to-date.  Continue regular follow-up exams preventative screening and health care maintenance.    The following high BMI interventions were performed this visit: encouragement to exercise, weight monitoring, weight loss from baseline weight and lifestyle education regarding diet.  Weight goal < 260 pounds initially, < 240 pounds ideally.     Patient requesting permanent sterilization.  Risks benefits alternatives were discussed vasectomy.  Previous vasectomy literature was provided.  Consent form reviewed and signed by patient.  Patient will schedule at his convenience.    Discussed finding of large pedunculated skin lesion right axilla area and will need definitive excision with likely suture placement following in order to achieve adequate hemostasis.  Patient will schedule at his convenience.    Subjective:      Siva Rowan is a 31 y.o. male who presents for an annual exam.  Patient wants permanent sterilization.  2 children.  Also has skin take described in right axilla.  Mild irritation at times.  Like to have removed permanently.  History of high cholesterol January 2012, mild, diet controlled.  Continued weight gain noted.  Not exercising a consistent basis however states does have exercise equipment at home medical to resume using soon.  Needs flu shot.  Immunizations including Adacel booster update.  Past medical social and family history reviewed and  "updated as noted below.    Engaged - Connie  1 son - 11  1 daughter - 6  Tobacco: none (quit ~ 2015)  EtOH: occ  Mom - mild diabetes  Dad -   2 younger sis -   Surgeries: none  Hospitalizations: none   Work: auto glass installation  Hobbies:  [employer]: football, baseball; plays softball    Healthy Habits:   Regular Exercise: Yes and less during the winter  Healthy Diet: No  Dental Visits Regularly: Yes  Seat Belt: Yes   Sexually active: Yes  Colonoscopy: N/A  Lipid Profile: Yes  Glucose Screen: Yes    Immunization History   Administered Date(s) Administered     Influenza, seasonal,quad inj 36+ mos 02/08/2017     Tdap 08/08/2016     Immunization status: up to date and documented, missing doses of flu shot.  Vision Screening:both eyes  Hearing: PASS     No current outpatient prescriptions on file.     No current facility-administered medications for this visit.      History reviewed. No pertinent past medical history.  No past surgical history on file.  Review of patient's allergies indicates no known allergies.  Family History   Problem Relation Age of Onset     Diabetes Mother      No Medical Problems Sister      Heart disease Maternal Grandfather      Social History     Social History     Marital status: Single     Spouse name: N/A     Number of children: N/A     Years of education: N/A     Occupational History     Not on file.     Social History Main Topics     Smoking status: Former Smoker     Packs/day: 0.75     Years: 15.00     Quit date: 1/8/2015     Smokeless tobacco: Never Used     Alcohol use Yes     Drug use: No     Sexual activity: Not on file     Other Topics Concern     Not on file     Social History Narrative       Review of Systems  Comprehensive ROS: as above, otherwise all negative.           Objective:     Visit Vitals     /80     Pulse 68     Ht 6' 3\" (1.905 m)     Wt (!) 294 lb (133.4 kg)     BMI 36.75 kg/m2     Body mass index is 36.75 " kg/(m^2).    Physical    General Appearance:    Alert, cooperative, no distress, appears stated age.  Obesity   Head:    Normocephalic, without obvious abnormality, atraumatic   Eyes:    PERRL, conjunctiva/corneas clear, EOM's intact, fundi     benign, both eyes.  Wear glasses typically.        Ears:    Normal TM's and external ear canals, both ears   Nose:   Nares normal, septum midline, mucosa normal, no drainage    or sinus tenderness   Throat:   Lips, mucosa, and tongue normal; teeth and gums normal   Neck:   Supple, symmetrical, trachea midline, no adenopathy;        thyroid:  No enlargement/tenderness/nodules; no carotid    bruit or JVD   Back:     Symmetric, no curvature, ROM normal, no CVA tenderness   Lungs:     Clear to auscultation bilaterally, respirations unlabored   Chest wall:    No tenderness or deformity   Heart:    Regular rate and rhythm, S1 and S2 normal, no murmur, rub   or gallop   Abdomen:     Soft, non-tender, bowel sounds active all four quadrants,     no masses, no organomegaly.     Genitalia:    Normal male without lesion, discharge or tenderness.  No inguinal hernia noted.  Palpable vas deferens bilaterally.   Rectal:    deferred   Extremities:   Extremities normal, atraumatic, no cyanosis or edema   Pulses:   2+ and symmetric all extremities   Skin:   Skin color, texture, turgor normal, no rashes.  Large 2 cm pedunculated skin tag in right axilla.   Lymph nodes:   Cervical, supraclavicular, and axillary nodes normal   Neurologic:   CNII-XII intact. Normal strength, sensation and reflexes       throughout         Lab Results   Component Value Date    CHOL 239 (H) 01/20/2012     Lab Results   Component Value Date    HDL 34 (L) 01/20/2012     Lab Results   Component Value Date    LDLCALC 175.2 (H) 01/20/2012     Lab Results   Component Value Date    TRIG 149 01/20/2012     No components found for: CHOLHDL

## 2021-06-09 NOTE — PROGRESS NOTES
Vasectomy  Date/Time: 3/31/2017 12:31 PM  Performed by: JEANNE SALGUERO  Authorized by: JEANNE SALGUERO         Vasectomy Procedure Note    Indications: 31 y.o. male desiring permanent sterilization    Pre-operative Diagnosis: Undesired fertility    Post-operative Diagnosis: Undesired fertility    Anesthesia: 1% lidocaine, 3 ml    Procedure Details:    Siva Rowan  is seen today for scheduled vasectomy.  Patient desires permanent sterilization.  Consent form previously reviewed and signed by patient.  Patient elects to continue with scheduled procedure.    Engaged - Connie  1 son - 11  1 daughter - 6  Tobacco: none (quit ~ 2015)  EtOH: occ  Mom - mild diabetes  Dad -   2 younger sis -   Surgeries: none  Hospitalizations: none   Work: auto glass installation  Hobbies:  [employer]: football, baseball; plays softball    Siva Rowan was prepped and draped in usual sterile fashion.  Right vas deferens isolated subcutaneously.  1% lidocaine injected superficially then to vas deferens.  15 blade incision performed.  Curved hemostat for blunt dissection.  Towel clip for vas deferens isolation.  Vas deferens sheath removed exposing normal-appearing vas deferens.  4-O chromic suture both proximal and distal segment of vas deferens with central portion excised for pathology.  Electrocautery of vas deferens ends performed.  Distal end then replacing into fascia with 5-0 chromic suture.  Good hemostasis noted.  Vas deferens then replacing into scrotum.  Single 4-0 chromic suture for skin incision closure.    Left vas deferens then isolated in a similar fashion.  1% lidocaine injected superficially and then to level of vas deferens and surrounding tissue.  15 blade incision performed.  Curved hemostat for blunt dissection.  Towel clip for vas deferens isolation.  Vas deferens sheath removed exposing normal-appearing vas deferens.  4-0 chromic suture both proximal and distal segment of vas  deferens with central portion excised for pathology.  Electrocautery of vas deferens ends performed.  Distal and replaced in the fascia with 5-0 chromic suture.  Good hemostasis noted.  Vas deferens then replaced into scrotum.  Single 4-0 chromic for skin incision closure.  Triple antibiotic with 4 x 4 gauze pads placed at bilateral incision sites.      Right axillary pedunculated skin tag was excised in typical fashion.  Required 4-0 Ethilon suture in simple interrupted fashion in order to achieve appropriate hemostasis.    Specimen: vas segment, bilateral    Condition: Stable    Complications: none    Plan:  1. Continue contraception until negative sperm analysis. Semen count after 20-25 ejaculations and 12 weeks s/p vasectomy.  2. Warning signs of infection were reviewed.   3. Written home care instructions provided.  Backup contraception until confirmed sterility.  May resume normal bathing after 24 hours.  Avoid strenuous activity times one week.  Ibuprofen or Tylenol OTC for pain management.  Notify with increased swelling, bleeding, or drainage.  Postvasectomy semen analysis in 12 weeks after 20-25 ejaculations to confirm desired sterility.  Call the clinic if excessive pain, bleeding or swelling.  4.  Large pedunculated skin take from right axilla removed today.  No complication.  Single 4-0 Ethilon suture for skin incision closure which will be removed in 10-14 days.  Good hemostasis prior to clinic discharge.  Specimen was sent for pathology.

## 2021-06-14 NOTE — PROGRESS NOTES
Assessment:    1. Atypical squamoproliferative skin lesion  Surgical Pathology Exam         Plan:    Discussed findings regarding left lower back atypical squamous proliferative skin lesion, raised.  5 mm.  Irritated with clothing.  Please see procedure note for definitive shave biopsy.  Sent for pathology.  Wound care is reviewed.  Follow-up on as-needed basis.        Subjective:    Siva Rowan is seen today for skin lesion left lower back.  Initially describes a skin tag and possible mole.  Becomes irritated with clothing nuisances on his belt line.  Would like to have removed.  No other skin lesions identified.  No personal or family history of skin cancer noted.    Engaged - Connie  1 son - 11  1 daughter - 6  Tobacco: none (quit ~ 2015)  EtOH: occ  Mom - mild diabetes  Dad -   2 younger sis -   Surgeries: none  Hospitalizations: none   Work: auto glass installation  Hobbies:  [employer]: football, baseball; plays softball    No past surgical history on file.     Family History   Problem Relation Age of Onset     Diabetes Mother      No Medical Problems Sister      Heart disease Maternal Grandfather         No past medical history on file.     Social History   Substance Use Topics     Smoking status: Former Smoker     Packs/day: 0.75     Years: 15.00     Quit date: 1/8/2015     Smokeless tobacco: Never Used     Alcohol use Yes        No current outpatient prescriptions on file.     No current facility-administered medications for this visit.           Objective:    Vitals:    11/09/17 1549   BP: 128/80   Pulse: 88   Weight: (!) 299 lb (135.6 kg)      Body mass index is 37.37 kg/(m^2).    Alert.  No apparent distress.  5 mm raised skin lesion left lower back without pedunculated nature.  Surrounding erythema with slight irritation without ulceration.  No drainage.

## 2021-06-14 NOTE — PROGRESS NOTES
"Skin biopsy left lower back 5 mm lesion  Date/Time: 11/9/2017 4:17 PM  Performed by: JEANNE SALGUERO  Authorized by: JEANNE SALGUERO   Consent: Verbal consent obtained.  Consent given by: patient  Patient understanding: patient states understanding of the procedure being performed  Time out: Immediately prior to procedure a \"time out\" was called to verify the correct patient, procedure, equipment, support staff and site/side marked as required.  Preparation: Patient was prepped and draped in the usual sterile fashion.  Local anesthesia used: yes    Anesthesia:  Local anesthesia used: yes    Sedation:  Patient sedated: no  Patient tolerance: Patient tolerated the procedure well with no immediate complications  Comments: 1% lidocaine with epinephrine injected at base of the 5 mm lesion left lower back.  15 blade used for shave biopsy tolerated well.  Good hemostasis following.  Silver nitrate utilized.  Triple antibiotic and Band-Aid applied following.          "

## 2021-06-16 PROBLEM — Z83.79 FAMILY HISTORY OF ULCERATIVE COLITIS: Status: ACTIVE | Noted: 2020-01-23

## 2021-06-16 PROBLEM — F10.10 ALCOHOL ABUSE: Status: ACTIVE | Noted: 2021-03-30

## 2021-06-16 PROBLEM — R03.0 ELEVATED BLOOD PRESSURE READING WITHOUT DIAGNOSIS OF HYPERTENSION: Status: ACTIVE | Noted: 2021-03-30

## 2021-06-16 PROBLEM — K76.0 HEPATIC STEATOSIS: Status: ACTIVE | Noted: 2019-01-04

## 2021-06-16 PROBLEM — Z87.891 SMOKING HISTORY: Status: ACTIVE | Noted: 2018-06-04

## 2021-06-16 PROBLEM — F41.9 ANXIETY DISORDER, UNSPECIFIED TYPE: Status: ACTIVE | Noted: 2020-01-23

## 2021-06-16 PROBLEM — E66.811 CLASS 1 OBESITY DUE TO EXCESS CALORIES WITH SERIOUS COMORBIDITY AND BODY MASS INDEX (BMI) OF 34.0 TO 34.9 IN ADULT: Status: ACTIVE | Noted: 2019-01-04

## 2021-06-16 PROBLEM — R79.89 ABNORMAL LFTS: Status: ACTIVE | Noted: 2019-01-04

## 2021-06-16 PROBLEM — R73.01 IMPAIRED FASTING GLUCOSE: Status: ACTIVE | Noted: 2019-01-04

## 2021-06-16 PROBLEM — D69.6 THROMBOCYTOPENIA (H): Status: ACTIVE | Noted: 2021-03-30

## 2021-06-16 PROBLEM — E66.09 CLASS 1 OBESITY DUE TO EXCESS CALORIES WITH SERIOUS COMORBIDITY AND BODY MASS INDEX (BMI) OF 34.0 TO 34.9 IN ADULT: Status: ACTIVE | Noted: 2019-01-04

## 2021-06-16 PROBLEM — R19.7 DIARRHEA, UNSPECIFIED TYPE: Status: ACTIVE | Noted: 2020-01-23

## 2021-06-16 NOTE — PROGRESS NOTES
Assessment/Plan:     1. Routine general medical examination at a health care facility  Routine healthcare maintenance.  Preventative cares reviewed.  Patient will receive COVID-19 vaccine once available.  Immunizations otherwise appear up-to-date.  Annual physical exams to continue.    2. Class 1 obesity due to excess calories with serious comorbidity and body mass index (BMI) of 34.0 to 34.9 in adult  Dietary and exercise modification for weight goal less than 260 pounds initially, less than 240 pounds ideally.    3. Abnormal LFTs  LFT elevation historically.  Fatty liver on hepatic ultrasound.  Also alcohol excess currently 4 drinks daily.  Recommendation for no more than 2 ounces of alcohol daily versus abstinence.  Will follow LFTs closely and reassess at follow-up in 3 months.  - GGT (Gamma GT)  - Comprehensive Metabolic Panel    4. Impaired fasting glucose  Impaired fasting glucose.  Fasting glucose and A1c obtained.  - Comprehensive Metabolic Panel  - Lipid Cascade  - Glycosylated Hemoglobin A1c    5. Anxiety disorder, unspecified type  Anxiety disorder stable with AURELIO-7 questionnaire 2 out of 21 with PHQ-9 questionnaire 0 out of 27 off medication since December 2020.  Previously on sertraline with subsequent switch to venlafaxine however likely diarrhea associated with medication but along gluten sensitivity.    6. Thrombocytopenia (H)  History of mild thrombocytopenia.  Repeat CBC with platelet count.  - Comprehensive Metabolic Panel  - HM2(CBC w/o Differential)    7. Encounter for screening for HIV  Routine HIV screen completed.  - HIV Antigen/Antibody Screening [place]    8. Right knee skin infection  Recent right knee infection question infectious bursitis with staph infection described after abrasion February 28, 2021 in [facility].  Subsequent treatment with doxycycline which ended around February 13, 2021.  Mild residual inflammation only without signs of persistent bacterial  infection.    9. Elevated blood pressure reading without diagnosis of hypertension  Blood pressure elevations noted as high as 162/96 on recheck after exam with improvement to 142/80 prior to clinic discharge.  Will reassess at follow-up in 3 months to ensure goal less than 130/80 initially.    10. Alcohol abuse  Moderation of alcohol consumption no more than 2 ounces daily versus abstinence recommendations.       The following high BMI interventions were performed this visit: encouragement to exercise, weight monitoring, weight loss from baseline weight and lifestyle education regarding diet.  Ensure ongoing efforts to achieve weight goal < 260 pounds initially, < 240 pounds ideally.              Subjective:      Siva Rowan is a 35 y.o. male who presents for an annual exam.  Recent right knee infection described.  Cut knee February 28, 2021 after accident while traveling.  Increased pain and tenderness and swelling noted.  Had aspiration apparently with staph infection described.  Given doxycycline which she completed around March 13, 2021.  Ibuprofen on as-needed basis.  Improvement noted however  when he kneels on his knee.  No longer on anxiolytic medication including sertraline or venlafaxine.  Some diarrhea however seems to be better off medication as well as dietary changes in which restricting gluten.  LFT elevation with hepatic steatosis on prior hepatic ultrasound.  Mildly low platelet count.  Does drink about 4 seltzers or hard alcohol daily.  Not getting consistent exercise.  Family history of ulcerative colitis maternal uncle.  Denies recent fever cough or shortness of breath.  Comprehensive review of systems as above otherwise all negative.    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)  Tobacco: none (quit ~ 2014)  EtOH:  4 seltzer per day  Mom - mild diabetes  Dad - lung CA (smoker); heart stents  2 younger sis -   Maternal uncle  with ulcerative colitis  Surgeries: vasectomy 3/31/17  Hospitalizations: none   Work: auto glass installation  Hobbies:  [employer]: football, baseball; plays softball    Healthy Habits:   Regular Exercise: No  Healthy Diet: No  Dental Visits Regularly: No and due to COVID-19  Seat Belt: Yes   Sexually active: Yes  Colonoscopy: Yes and family history of ulcerative colitis  Lipid Profile: Yes  Glucose Screen: Yes    Immunization History   Administered Date(s) Administered     INFLUENZA,SEASONAL QUAD, PF, =/> 6months 01/23/2020     Influenza,seasonal,quad inj =/> 6months 02/08/2017     Tdap 08/08/2016     Immunization status: up to date and documented.  Vision Screening:both eyes  Hearing: PASS     Current Outpatient Medications   Medication Sig Dispense Refill     sertraline (ZOLOFT) 50 MG tablet Take 1 tablet (50 mg total) by mouth daily. 90 tablet 2     venlafaxine (EFFEXOR-XR) 75 MG 24 hr capsule Take 1 capsule (75 mg total) by mouth daily. 30 capsule 2     No current facility-administered medications for this visit.      History reviewed. No pertinent past medical history.  History reviewed. No pertinent surgical history.  Patient has no known allergies.  Family History   Problem Relation Age of Onset     Diabetes Mother      No Medical Problems Sister      Heart disease Maternal Grandfather      Social History     Socioeconomic History     Marital status: Single     Spouse name: Not on file     Number of children: Not on file     Years of education: Not on file     Highest education level: Not on file   Occupational History     Not on file   Social Needs     Financial resource strain: Not on file     Food insecurity     Worry: Not on file     Inability: Not on file     Transportation needs     Medical: Not on file     Non-medical: Not on file   Tobacco Use     Smoking status: Former Smoker     Packs/day: 0.75     Years: 15.00     Pack years: 11.25     Quit date: 1/8/2015     Years since  "quittin.2     Smokeless tobacco: Never Used   Substance and Sexual Activity     Alcohol use: Yes     Drug use: No     Sexual activity: Not on file   Lifestyle     Physical activity     Days per week: Not on file     Minutes per session: Not on file     Stress: Not on file   Relationships     Social connections     Talks on phone: Not on file     Gets together: Not on file     Attends [employer] service: Not on file     Active member of club or organization: Not on file     Attends meetings of clubs or organizations: Not on file     Relationship status: Not on file     Intimate partner violence     Fear of current or ex partner: Not on file     Emotionally abused: Not on file     Physically abused: Not on file     Forced sexual activity: Not on file   Other Topics Concern     Not on file   Social History Narrative     Not on file       Review of Systems  Comprehensive ROS: as above, otherwise all negative.           Objective:     /80   Pulse 78   Temp 97.5  F (36.4  C)   Ht 6' 3.75\" (1.924 m)   Wt (!) 280 lb (127 kg)   SpO2 97%   BMI 34.31 kg/m    Body mass index is 34.31 kg/m .    Physical    General Appearance:    Alert, cooperative, no distress, appears stated age.  BMI 34.31.   Head:    Normocephalic, without obvious abnormality, atraumatic   Eyes:    PERRL, conjunctiva/corneas clear, EOM's intact, fundi     benign, both eyes        Ears:    Normal TM's and external ear canals, both ears   Nose:   Nares normal, septum midline, mucosa normal, no drainage    or sinus tenderness   Throat:   Lips, mucosa, and tongue normal; teeth and gums normal   Neck:   Supple, symmetrical, trachea midline, no adenopathy;        thyroid:  No enlargement/tenderness/nodules; no carotid    bruit or JVD   Back:     Symmetric, no curvature, ROM normal, no CVA tenderness   Lungs:     Clear to auscultation bilaterally, respirations unlabored   Chest wall:    No tenderness or deformity   Heart:    Regular rate and rhythm, " S1 and S2 normal, no murmur, rub   or gallop   Abdomen:     Soft, non-tender, bowel sounds active all four quadrants,     no masses, no organomegaly.     Genitalia:    Normal male without lesion, discharge or tenderness.  No inguinal hernia noted.     Rectal:    deferred   Extremities:   Extremities normal, atraumatic, no cyanosis or edema.  Mild right knee prepatellar inflammation without significant tenderness or other signs of bacterial infection.  Full joint range of motion without difficulty.   Pulses:   2+ and symmetric all extremities   Skin:   Skin color, texture, turgor normal, no rashes.  Right posterior chest nevus somewhat irregular however no current concern for significant atypia.  Will monitor with patient to take a photograph every 3 months and notify of any changes identified.   Lymph nodes:   Cervical, supraclavicular, and axillary nodes normal   Neurologic:   CNII-XII intact. Normal strength, sensation and reflexes       throughout            US ABDOMEN LIMITED  6/16/2018 9:10 AM     INDICATION: Elevated liver function tests.  COMPARISON: None.     FINDINGS:  GALLBLADDER: Normal, without cholelithiasis.  BILE DUCTS: No bile duct dilation. The common bile duct measures 4 mm.  LIVER: Diffusely echogenic consistent with fatty infiltration.  RIGHT KIDNEY: No hydronephrosis.  PANCREAS: Not imaged in detail on this limited study. No incidental abnormalities.     No ascites in the right upper quadrant.     IMPRESSION:   CONCLUSION:  1.  Fatty liver.  2.  Exam otherwise negative.        This note has been dictated using voice recognition software and as a result may contain minor grammatical errors and unintended word substitutions.

## 2021-06-17 NOTE — PATIENT INSTRUCTIONS - HE
Patient Instructions by Keyur Galarza, PT at 6/21/2019  7:00 AM     Author: Keyur Galarza PT Service: -- Author Type: Physical Therapist    Filed: 6/21/2019  7:19 AM Encounter Date: 6/21/2019 Status: Signed    : Keyur Galarza, PT (Physical Therapist)        PIRIFORMIS STRETCH    While lying on your back with both knee bent, cross your affected leg on the other knee.     Next, hold your unaffected thigh and pull it up towards your chest until a stretch is felt in the buttock.    Hold 30 seconds  2X/side  2X/day

## 2021-06-17 NOTE — PATIENT INSTRUCTIONS - HE
"Patient Instructions by Keyur Galarza PT at 6/14/2019  7:00 AM     Author: Keyur Galarza PT Service: -- Author Type: Physical Therapist    Filed: 6/14/2019  7:26 AM Encounter Date: 6/14/2019 Status: Signed    : Keyur Galarza PT (Physical Therapist)        BRACE - SINGLE KNEE EXTENSION    While lying on your back with knees bent, straighten out one knee while keeping the leg off the ground. Hold as indicated, then return to original position. Next, perform on the other leg.    Use your stomach muscles to keep your spine from moving the entire time.    10X/leg  1X/day      BRIDGING    While lying on your back, tighten your lower abdominals, squeeze your buttocks and then raise your buttocks off the floor/bed as creating a \"Bridge\" with your body.    Hold 5 seconds  10-15X  1X/day              "

## 2021-06-17 NOTE — PATIENT INSTRUCTIONS - HE
Patient Instructions by Keyur Galarza PT at 7/19/2019  7:30 AM     Author: Keyur Galarza PT Service: -- Author Type: Physical Therapist    Filed: 7/19/2019  7:55 AM Encounter Date: 7/19/2019 Status: Signed    : Keyur Galarza PT (Physical Therapist)       Femoral Nerve Sliders      INSTRUCTIONS:  ? Lie on your stomach and prop up on your elbows  ? Flex one knee toward the ceiling and also tip your head slightly back  ? One you reach the furthest you can go, slowly bring the leg down and also flex your head down  ? Repeat this motion without holding, just gently move up and down  ? Repeat _____ times  ? Repeat _____ sets  Repeat _____ times per day     Prone Quad Stretch    Lie down flat on your stomach. Wrap a strap (belt, towel, dog leash) around the top of one of your feet and pull the strap across your opposite shoulder so that your knee starts to curl up to your body. Pull until a stretch is felt across the front of your thigh.     Hold 15-30 seconds  2X/side  1-2X/day

## 2021-06-18 NOTE — PROGRESS NOTES
HPI: Siva is a 32yo M who presents to clinic at the request of Dr. Adrian for evaluation of a burning sensation of his tongue. This sensation comes and goes with eating certain foods. He feels like the taste buds pop up and get sensitive, especially with salty and spicy foods. Sometimes these foods also cause a short-term indigestion. He reports no fevers, unintentional weight loss, odynophagia, dysphagia, lesions, hemoptysis, cough, or voice changes. Stopped smoking 4 yrs ago.    Past medical history, surgical history, social history, family history, medications, and allergies have been reviewed with the patient and are documented above.    Review of Systems: a 10-system review was performed. Pertinent positives are noted in the HPI and on a separate scanned document in the chart.    PHYSICAL EXAMINATION:  GEN: no acute distress, normocephalic  EYES: extraocular movements are intact, pupils are equal and round. Sclera clear.   EARS: auricles are normally formed. The external auditory canals are clear with minimal to no cerumen. Tympanic membranes are intact bilaterally with no signs of infection, effusion, retractions, or perforations.  NOSE: anterior nares are patent.   OC/OP: clear, dentition is in good repair. The tongue and palate are fully mobile and symmetric. Tongue tip slightly smooth with cracking along the lateral edges, c/w burning tongue   NECK: soft and supple. No lymphadenopathy or masses. Airway is midline.  NEURO: CN VII symmetric. alert and oriented. No spontaneous nystagmus. Gait is normal.  PULM: breathing comfortably on room air, normal chest expansion with respiration  HEART: normal carotid pulses, no cyanosis or clubbing.    Glu (2018): 122, HgA1c (2017): 6.1    MEDICAL DECISION-MAKING: Siva is a 32yo M with burning tongue. Explained that this is a common, benign, issue. Provided information. No follow-up necessary.

## 2021-06-18 NOTE — PROGRESS NOTES
Assessment/Plan:    1. Routine general medical examination at a health care facility  Annual physical exam completed.  Following labs will be ordered as noted.  Will follow up with patient regarding these results when available. He is up-to-date on all other health maintenance.  Return to clinic in 1 year for annual physical exam.  - Comprehensive Metabolic Panel  - Lipid Cascade FASTING    2. Overweight  Discussed importance of healthy diet and daily exercise for management of high BMI.  Will check hemoglobin A1c due to recent weight gain and family history of type 2 diabetes.  -Glycosylated hemoglobin A1c  -Lipid cascade FASTING    3. Painful tongue  4.Dry Mouth   Patient is not taking any medications that would contribute to these symptoms. Physical exam is unremarkable. No signs of lichen patches.  Has previously been evaluated by dentist for dry mouth symptoms. Will refer to ENT for further evaluation of painful tongue and dry mouth.   - Ambulatory referral to ENT    5. Back pain  Low back pain likely from overuse at his job.  Finds relief with ice when pain flares up. Does not feel need for any medication or further evaluation at this time.    6. Pain in joint, lower leg  Has occasional joint pain of his right knee.  Wears a knee sleeve when pain flares.  This is adequately managing discomfort at this time.    7. Smoking history  Quit smoking in 2015.  No longer using Chantix.     Subjective:      Siva Rowan is a 32 y.o. male who presents for an annual exam.  He would like to have right ear evaluated for possible wax buildup.  He wears earplugs often and feels that this may be causing more earwax.  He expresses concern about painful tongue when eating spicy and salty foods.  The symptoms did not start until after he  quit smoking a couple of years ago.  Was previously taking Chantix, but did not have these symptoms when taking it. Ever since he completely quit smoking he feels a scratching sensation  whenever he eats spicy or salty foods.  Also has been having dry mouth symptoms since quitting smoking. He has been evaluated by his dentist who recommended that he start Biotene mouthwash.  This is not made much difference with these symptoms.  Dry mouth occurs mostly upon waking up in the morning.  He is not taking any medications.  Has occasionally taken OTC  allergy pills in the past.  Has not taken in a while.      Patient notes that he has gained a little bit more weight recently.  He tries to eat a well-balanceddiet.   Oftentimes does not eat at all in the morning or throughout the day and then eats quite a bit at night.  Is aware that this is not healthy habit.  He does not exercise regularly.  Has a physical job doing auto glass installation.  Acknowledges that he should be working out more.  Has family history of type 2 diabetes.  Mom and grandfather were both diagnosed in adulthood.  Denies family history of colon cancer.  Otherwise feels well and has no additional concerns today.    Healthy Habits:   Regular Exercise: No  Healthy Diet: Yes  Dental Visits Regularly: Yes  Seat Belt: Yes   Sexually active: Yes  Colonoscopy: N/A  Lipid Profile: Yes  Glucose Screen: Yes    Engaged - Connie  1 son - 11  1 daughter - 6  Tobacco: none (quit ~ 2015)  EtOH: occ  Mom - mild diabetes  Dad -   2 younger sis -   Surgeries: none  Hospitalizations: none   Work: auto glass installation  Hobbies:  [employer]: football, baseball; plays softball    Immunization History   Administered Date(s) Administered     Influenza, seasonal,quad inj 36+ mos 02/08/2017     Tdap 08/08/2016     Immunization status: up to date and documented.    No current outpatient prescriptions on file.     No current facility-administered medications for this visit.      No past medical history on file.  No past surgical history on file.  Review of patient's allergies indicates no known allergies.  Family History   Problem  "Relation Age of Onset     Diabetes Mother      No Medical Problems Sister      Heart disease Maternal Grandfather      Social History     Social History     Marital status: Single     Spouse name: N/A     Number of children: N/A     Years of education: N/A     Occupational History     Not on file.     Social History Main Topics     Smoking status: Former Smoker     Packs/day: 0.75     Years: 15.00     Quit date: 1/8/2015     Smokeless tobacco: Never Used     Alcohol use Yes     Drug use: No     Sexual activity: Not on file     Other Topics Concern     Not on file     Social History Narrative       Review of Systems  Comprehensive ROS: as above, otherwise all negative.           Objective:     /80 (Patient Site: Right Arm, Patient Position: Sitting, Cuff Size: Adult Large)  Pulse 68  Ht 6' 4\" (1.93 m)  Wt (!) 308 lb 14.4 oz (140.1 kg)  BMI 37.6 kg/m2  Body mass index is 37.6 kg/(m^2).    Physical    General Appearance:    Alert, cooperative, no distress, appears stated age.     Head:    Normocephalic, without obvious abnormality, atraumatic   Eyes:    PERRL, conjunctiva/corneas clear, EOM's intact, fundi     benign, both eyes        Ears:    Normal TM's and external ear canals, both ears.  No cerumen present.   Nose:   Nares normal, septum midline, mucosa normal, no drainage    or sinus tenderness   Throat:   Lips, mucosa, and tongue normal; teeth and gums normal   Neck:   Supple, symmetrical, trachea midline, no adenopathy;        thyroid:  No enlargement/tenderness/nodules; no carotid    bruit or JVD   Back:     Symmetric, no curvature, ROM normal, no CVA tenderness   Lungs:     Clear to auscultation bilaterally, respirations unlabored   Chest wall:    No tenderness or deformity   Heart:    Regular rate and rhythm, S1 and S2 normal, no murmur, rub   or gallop   Abdomen:     Soft, non-tender, bowel sounds active all four quadrants,     no masses, no organomegaly.     Genitalia:   Deferred per patient. "   Rectal:   Deferred per patient.   Extremities:   Extremities normal, atraumatic, no cyanosis or edema   Pulses:   2+ and symmetric all extremities   Skin:   Skin color, texture, turgor normal, no rashes or lesions   Lymph nodes:   Cervical, supraclavicular, and axillary nodes normal   Neurologic:   CNII-XII intact. Normal strength, sensation and reflexes       throughout              This note has been dictated using voice recognition software. Any grammatical or context distortions are unintentional and inherent to the use of this software.

## 2021-06-22 NOTE — PROGRESS NOTES
Assessment/Plan:    1. Routine general medical examination at a health care facility  Routine healthcare maintenance.  Preventative cares reviewed.  Immunizations otherwise up-to-date.  Declines flu shot.  Annual physicals to continue.    2. Class 2 obesity due to excess calories without serious comorbidity with body mass index (BMI) of 35.0 to 35.9 in adult  Therapeutic lifestyle changes reviewed for weight goal less than 260 pounds initially, less than 240 pounds ideally.  Lipid cascade pending.  - Lipid Cascade    3. Hepatic steatosis  History of hepatic steatosis and will repeat GGT as well as comprehensive metabolic panel and CBC.  Prior ultrasound reviewed as noted below.    4. Abnormal LFTs  As above.  - Comprehensive Metabolic Panel  - HM2(CBC w/o Differential)  - GGT (Gamma GT)    5. Impaired fasting glucose  Impaired fasting glucose with improvement from 6.4% down to 5.6% today.  Fasting glucose pending.  - Comprehensive Metabolic Panel  - Glycosylated Hemoglobin A1c    6. Anxiety disorder, unspecified type  Anxiety disorder new diagnosis.  Sertraline 50 mg daily.  Reassess in follow-up no later than 12 weeks, sooner with concerns.  PHQ 9 questionnaire 12 out of 27 and AURELIO 7 questionnaire 15 out of 21.  - sertraline (ZOLOFT) 50 MG tablet; Take 1 tablet (50 mg total) by mouth daily.  Dispense: 30 tablet; Refill: 2      The following high BMI interventions were performed this visit: encouragement to exercise, weight monitoring, weight loss from baseline weight and lifestyle education regarding diet. Ensure ongoing efforts to achieve weight goal < 260 pounds initially, < 240 pounds ideally.         Subjective:    Siva Rowan is seen today for physical exam.  General doing well.  Left shoulder pain.  Medial aspect of left knee pain.  Does feel more anxious and worries significantly about his health.  Has had LFT elevation in the past associate with hepatic steatosis with acute hepatitis panel negative  "June 14, 2018 with  and  and GGT 81.  Not drinking alcohol this month after the new year.  Does do a lot of pushing and pulling at work.  Does cause some shoulder discomfort etc.  History of impaired fasting glucose.  Prior A1c is high at 6.4%.  Known dyslipidemia.  Patient is fasting today.  No recent illness.  Comprehensive review of systems as above otherwise all negative.    Engaged - Connie  1 son - 13  1 daughter - 8  Tobacco: none (quit ~ 2014)  EtOH: occ  Mom - mild diabetes  Dad -   2 younger sis -   Surgeries: none  Hospitalizations: none   Work: auto glass installation  Hobbies:  [employer]: football, baseball; plays softball    History reviewed. No pertinent surgical history.     Family History   Problem Relation Age of Onset     Diabetes Mother      No Medical Problems Sister      Heart disease Maternal Grandfather         History reviewed. No pertinent past medical history.     Social History     Tobacco Use     Smoking status: Former Smoker     Packs/day: 0.75     Years: 15.00     Pack years: 11.25     Last attempt to quit: 1/8/2015     Years since quitting: 3.9     Smokeless tobacco: Never Used   Substance Use Topics     Alcohol use: Yes     Drug use: No        Current Outpatient Medications   Medication Sig Dispense Refill     sertraline (ZOLOFT) 50 MG tablet Take 1 tablet (50 mg total) by mouth daily. 30 tablet 2     No current facility-administered medications for this visit.           Objective:    Vitals:    01/04/19 1241   BP: 110/70   Pulse: 68   SpO2: 97%   Weight: (!) 282 lb (127.9 kg)   Height: 6' 3.25\" (1.911 m)      Body mass index is 35.01 kg/m .        General Appearance:    Alert, cooperative, no distress, appears stated age.  Mild psychomotor agitation.   Head:    Normocephalic, without obvious abnormality, atraumatic   Eyes:    PERRL, conjunctiva/corneas clear, EOM's intact, fundi     benign, both eyes        Ears:    Normal TM's and external ear " canals, both ears   Nose:   Nares normal, septum midline, mucosa normal, no drainage    or sinus tenderness   Throat:   Lips, mucosa, and tongue normal; teeth and gums normal   Neck:   Supple, symmetrical, trachea midline, no adenopathy;        thyroid:  No enlargement/tenderness/nodules; no carotid    bruit or JVD   Back:     Symmetric, no curvature, ROM normal, no CVA tenderness   Lungs:     Clear to auscultation bilaterally, respirations unlabored   Chest wall:    No tenderness or deformity   Heart:    Regular rate and rhythm, S1 and S2 normal, no murmur, rub   or gallop   Abdomen:     Soft, non-tender, bowel sounds active all four quadrants,     no masses, no organomegaly.     Genitalia:    Normal male without lesion, discharge or tenderness.  No inguinal hernia noted.     Rectal:    Normal tone.  Prostate normal/symmetric, no masses or tenderness.   Extremities:   Extremities normal, atraumatic, no cyanosis or edema   Pulses:   2+ and symmetric all extremities   Skin:   Skin color, texture, turgor normal, no rashes or lesions   Lymph nodes:   Cervical, supraclavicular, and axillary nodes normal   Neurologic:   CNII-XII intact. Normal strength, sensation and reflexes       throughout      Demonstrates normal range of motion bilateral shoulders.  CMS upper extremities intact.  Lower extremity exam without joint effusion etc.  Left knee with no significant medial collateral ligament tenderness on exam currently.  No calf tenderness.  Distal CMS lower extremities also normal.        US ABDOMEN LIMITED  6/16/2018 9:10 AM     INDICATION: Elevated liver function tests.  COMPARISON: None.     FINDINGS:  GALLBLADDER: Normal, without cholelithiasis.  BILE DUCTS: No bile duct dilation. The common bile duct measures 4 mm.  LIVER: Diffusely echogenic consistent with fatty infiltration.  RIGHT KIDNEY: No hydronephrosis.  PANCREAS: Not imaged in detail on this limited study. No incidental abnormalities.     No ascites in the  right upper quadrant.     IMPRESSION:   CONCLUSION:  1.  Fatty liver.  2.  Exam otherwise negative.        This note has been dictated using voice recognition software and as a result may contain minor grammatical errors and unintended word substitutions.

## 2021-06-26 ENCOUNTER — HEALTH MAINTENANCE LETTER (OUTPATIENT)
Age: 36
End: 2021-06-26

## 2021-10-16 ENCOUNTER — HEALTH MAINTENANCE LETTER (OUTPATIENT)
Age: 36
End: 2021-10-16

## 2022-01-13 ENCOUNTER — IMMUNIZATION (OUTPATIENT)
Dept: NURSING | Facility: CLINIC | Age: 37
End: 2022-01-13
Payer: COMMERCIAL

## 2022-01-13 PROCEDURE — 91305 COVID-19,PF,PFIZER (12+ YRS): CPT

## 2022-01-13 PROCEDURE — 0054A COVID-19,PF,PFIZER (12+ YRS): CPT

## 2022-01-19 ENCOUNTER — OFFICE VISIT (OUTPATIENT)
Dept: FAMILY MEDICINE | Facility: CLINIC | Age: 37
End: 2022-01-19
Payer: COMMERCIAL

## 2022-01-19 VITALS — HEIGHT: 76 IN | BODY MASS INDEX: 34.08 KG/M2

## 2022-01-19 DIAGNOSIS — M25.522 BILATERAL ELBOW JOINT PAIN: ICD-10-CM

## 2022-01-19 DIAGNOSIS — R06.83 SNORING: ICD-10-CM

## 2022-01-19 DIAGNOSIS — F41.9 ANXIETY DISORDER, UNSPECIFIED TYPE: ICD-10-CM

## 2022-01-19 DIAGNOSIS — Z00.00 ROUTINE GENERAL MEDICAL EXAMINATION AT A HEALTH CARE FACILITY: Primary | ICD-10-CM

## 2022-01-19 DIAGNOSIS — F10.10 ALCOHOL ABUSE: ICD-10-CM

## 2022-01-19 DIAGNOSIS — E78.5 HYPERLIPIDEMIA LDL GOAL <100: ICD-10-CM

## 2022-01-19 DIAGNOSIS — R73.01 IMPAIRED FASTING GLUCOSE: ICD-10-CM

## 2022-01-19 DIAGNOSIS — F33.1 MODERATE EPISODE OF RECURRENT MAJOR DEPRESSIVE DISORDER (H): ICD-10-CM

## 2022-01-19 DIAGNOSIS — M25.521 BILATERAL ELBOW JOINT PAIN: ICD-10-CM

## 2022-01-19 DIAGNOSIS — R79.89 ABNORMAL LFTS: ICD-10-CM

## 2022-01-19 DIAGNOSIS — D69.6 THROMBOCYTOPENIA (H): ICD-10-CM

## 2022-01-19 DIAGNOSIS — E66.09 CLASS 1 OBESITY DUE TO EXCESS CALORIES WITH SERIOUS COMORBIDITY AND BODY MASS INDEX (BMI) OF 34.0 TO 34.9 IN ADULT: ICD-10-CM

## 2022-01-19 DIAGNOSIS — E66.811 CLASS 1 OBESITY DUE TO EXCESS CALORIES WITH SERIOUS COMORBIDITY AND BODY MASS INDEX (BMI) OF 34.0 TO 34.9 IN ADULT: ICD-10-CM

## 2022-01-19 PROCEDURE — 99395 PREV VISIT EST AGE 18-39: CPT | Performed by: FAMILY MEDICINE

## 2022-01-19 PROCEDURE — 99214 OFFICE O/P EST MOD 30 MIN: CPT | Mod: 25 | Performed by: FAMILY MEDICINE

## 2022-01-19 RX ORDER — ESCITALOPRAM OXALATE 10 MG/1
10 TABLET ORAL DAILY
Qty: 90 TABLET | Refills: 1 | Status: SHIPPED | OUTPATIENT
Start: 2022-01-19 | End: 2022-08-04 | Stop reason: SINTOL

## 2022-01-19 ASSESSMENT — ANXIETY QUESTIONNAIRES
5. BEING SO RESTLESS THAT IT IS HARD TO SIT STILL: NOT AT ALL
7. FEELING AFRAID AS IF SOMETHING AWFUL MIGHT HAPPEN: NOT AT ALL
1. FEELING NERVOUS, ANXIOUS, OR ON EDGE: NEARLY EVERY DAY
GAD7 TOTAL SCORE: 10
2. NOT BEING ABLE TO STOP OR CONTROL WORRYING: MORE THAN HALF THE DAYS
4. TROUBLE RELAXING: SEVERAL DAYS
3. WORRYING TOO MUCH ABOUT DIFFERENT THINGS: MORE THAN HALF THE DAYS
GAD7 TOTAL SCORE: 10
6. BECOMING EASILY ANNOYED OR IRRITABLE: MORE THAN HALF THE DAYS
7. FEELING AFRAID AS IF SOMETHING AWFUL MIGHT HAPPEN: NOT AT ALL
GAD7 TOTAL SCORE: 10

## 2022-01-19 ASSESSMENT — PATIENT HEALTH QUESTIONNAIRE - PHQ9
SUM OF ALL RESPONSES TO PHQ QUESTIONS 1-9: 10
SUM OF ALL RESPONSES TO PHQ QUESTIONS 1-9: 10
10. IF YOU CHECKED OFF ANY PROBLEMS, HOW DIFFICULT HAVE THESE PROBLEMS MADE IT FOR YOU TO DO YOUR WORK, TAKE CARE OF THINGS AT HOME, OR GET ALONG WITH OTHER PEOPLE: SOMEWHAT DIFFICULT

## 2022-01-19 NOTE — PROGRESS NOTES
Answers for HPI/ROS submitted by the patient on 1/19/2022  If you checked off any problems, how difficult have these problems made it for you to do your work, take care of things at home, or get along with other people?: Somewhat difficult  PHQ9 TOTAL SCORE: 10  AURELIO 7 TOTAL SCORE: 10

## 2022-01-19 NOTE — PROGRESS NOTES
Assessment/Plan:     Routine general medical examination at a health care facility  Routine healthcare maintenance.  Preventative cares reviewed.  Annual physical exams to continue.    Class 1 obesity due to excess calories with serious comorbidity and body mass index (BMI) of 34.0 to 34.9 in adult  Dietary and exercise modification for weight goal less than 280 pounds initially, less than 240 pounds ideally with BMI currently 34.    Anxiety disorder, unspecified type  Initiated escitalopram 10 mg daily.  May increase to 20 mg dosing after 2 weeks if tolerating medicine without benefit described.  Otherwise reassess in office no later than 4 weeks with PHQ-9 questionnaire 10 out of 27 and AURELIO-7 questionnaire 10 out of 21.  - escitalopram (LEXAPRO) 10 MG tablet  Dispense: 90 tablet; Refill: 1    Alcohol abuse  Alcohol abuse concerns.  Significant alcohol consumption for about 6 months up until New Year's Day and has not had any significant alcohol since then.  Was drinking a case of seltzer every other day plus a bottle of hard liquor every other day.  Check GGT and LFTs with history of LFT elevation historically.  - GGT  - Comprehensive metabolic panel    Abnormal LFTs  As above.  Continue to abstain from alcohol.  - GGT  - Comprehensive metabolic panel  - CBC with platelets    Thrombocytopenia (H)  Obtain CBC with platelet to ensure stable or improved findings.  - CBC with platelets    Moderate episode of recurrent major depressive disorder (H)  Initiated escitalopram 10 mg daily.  May increase to 20 mg dosing after 2 weeks if tolerating medicine without benefit described.  Otherwise reassess in office no later than 4 weeks with PHQ-9 questionnaire 10 out of 27 and AURELIO-7 questionnaire 10 out of 21.  - escitalopram (LEXAPRO) 10 MG tablet  Dispense: 90 tablet; Refill: 1    Hyperlipidemia LDL goal <100  Lipid cascade to be obtained today.  Therapeutic lifestyle changes reviewed as noted above.  - Lipid panel  reflex to direct LDL Fasting    Impaired fasting glucose  Impaired fasting glucose.  Check A1c.  Fasting glucose to be obtained.  - Comprehensive metabolic panel  - Hemoglobin A1c    Snoring  Complete sleep study to rule out obstructive sleep apnea.     The following high BMI interventions were performed this visit: encouragement to exercise, weight monitoring, weight loss from baseline weight and lifestyle education regarding diet.  Ensure ongoing efforts to achieve weight goal < 280 pounds initially, < 240 pounds ideally.     Answers for HPI/ROS submitted by the patient on 1/19/2022  If you checked off any problems, how difficult have these problems made it for you to do your work, take care of things at home, or get along with other people?: Somewhat difficult  PHQ9 TOTAL SCORE: 10  AURELIO 7 TOTAL SCORE: 10          Subjective:     Siva Rowan is a 36 year old male who presents for an annual exam.  Struggling recently after his father passed away in June 2021.  Significant alcohol consumption increased with a case of seltzer every other day and then a bottle of hard liquor as well alternating days.  Both he and his wife have stopped drinking since New Year's Day.  Does have more energy in the morning.  Understands history of alcohol abuse historically.  Has had LFT elevation associated with this.  Impaired fasting glucose.  Still has concerns for labile mood depression and anxiety issues.  Does snore.  Wondering about sleep apnea as well.  Declines flu shot.  Had third shot Pfizer booster 1/13/2022.  Bilateral elbow pain more than usual as well as some other joint pain as well.  Wears a right elbow sleeve typically with sport activities which may be somewhat beneficial on the right side.  Comprehensive review of systems as above otherwise all negative      Healthy Habits:     Getting at least 3 servings of Calcium per day:  Yes    Bi-annual eye exam:  Yes    Dental care twice a year:  Yes    Sleep apnea or  symptoms of sleep apnea:  Excessive snoring    Diet:  Regular (no restrictions)    Frequency of exercise:  1 day/week    Duration of exercise:  45-60 minutes    Taking medications regularly:  Yes    Medication side effects:  None    PHQ-2 Total Score: 2    Additional concerns today:  No       Engaged - Connie brown 12/2006   1 son - 16   1 daughter - 11   Fostering his nephews (age 7 and 8)   Tobacco: none (quit ~ 2014)   EtOH: 4 seltzer per day   Mom - mild diabetes   Dad - lung CA (smoker); heart stents   2 younger sis -   Maternal uncle with ulcerative colitis   Surgeries: vasectomy 3/31/17   Hospitalizations: none   Work: auto glass installation   Hobbies:  [employer]: football, baseball; plays softball      Immunization History   Administered Date(s) Administered     COVID-19,PF,Pfizer (12+ Yrs) 04/26/2021, 05/17/2021, 01/13/2022, 01/13/2022     Influenza Vaccine IM > 6 months Valent IIV4 (Alfuria,Fluzone) 01/23/2020     Influenza Vaccine, 6+MO IM (QUADRIVALENT W/PRESERVATIVES) 02/08/2017     Tdap (Adacel,Boostrix) 08/08/2016     Immunization status: Declines seasonal flu shot otherwise immunizations are up-to-date.    Current Outpatient Medications   Medication Sig Dispense Refill     escitalopram (LEXAPRO) 10 MG tablet Take 1 tablet (10 mg) by mouth daily 90 tablet 1     History reviewed. No pertinent past medical history.  History reviewed. No pertinent surgical history.  Patient has no known allergies.  Family History   Problem Relation Age of Onset     Diabetes Mother      No Known Problems Sister      Heart Disease Maternal Grandfather      Social History     Socioeconomic History     Marital status: Single     Spouse name: Not on file     Number of children: Not on file     Years of education: Not on file     Highest education level: Not on file   Occupational History     Not on file   Tobacco Use     Smoking status: Former Smoker     Packs/day: 0.75     Years: 15.00     Pack years:  "     Quit date: 2015     Years since quittin.0     Smokeless tobacco: Never Used   Substance and Sexual Activity     Alcohol use: Yes     Drug use: No     Sexual activity: Not on file   Other Topics Concern     Not on file   Social History Narrative     Not on file     Social Determinants of Health     Financial Resource Strain: Not on file   Food Insecurity: Not on file   Transportation Needs: Not on file   Physical Activity: Not on file   Stress: Not on file   Social Connections: Not on file   Intimate Partner Violence: Not on file   Housing Stability: Not on file       Review of Systems  Comprehensive ROS: as above, otherwise all negative.           Objective:     Ht 1.93 m (6' 4\")   BMI 34.08 kg/m    Body mass index is 34.08 kg/m .    Physical    General Appearance:    Alert, cooperative, no distress, appears stated age.  BMI 34.  Tearful at times.   Head:    Normocephalic, without obvious abnormality, atraumatic   Eyes:    PERRL, conjunctiva/corneas clear, EOM's intact, fundi     benign, both eyes        Ears:    Normal TM's and external ear canals, both ears   Nose:   Nares normal, septum midline, mucosa normal, no drainage    or sinus tenderness   Throat:   Lips, mucosa, and tongue normal; teeth and gums normal   Neck:   Supple, symmetrical, trachea midline, no adenopathy;        thyroid:  No enlargement/tenderness/nodules; no carotid    bruit or JVD   Back:     Symmetric, no curvature, ROM normal, no CVA tenderness   Lungs:     Clear to auscultation bilaterally, respirations unlabored   Chest wall:    No tenderness or deformity   Heart:    Regular rate and rhythm, S1 and S2 normal, no murmur, rub   or gallop   Abdomen:     Soft, non-tender, bowel sounds active all four quadrants,     no masses, no organomegaly.     Genitalia:    Normal male without lesion, discharge or tenderness.  No inguinal hernia noted.     Rectal:    deferred   Extremities:   Extremities normal, atraumatic, no cyanosis or " edema   Pulses:   2+ and symmetric all extremities   Skin:   Skin color, texture, turgor normal, no rashes or lesions   Lymph nodes:   Cervical, supraclavicular, and axillary nodes normal   Neurologic:   CNII-XII intact. Normal strength, sensation and reflexes       throughout                This note has been dictated using voice recognition software and as a result may contain minor grammatical errors and unintended word substitutions.

## 2022-01-20 ENCOUNTER — LAB (OUTPATIENT)
Dept: LAB | Facility: CLINIC | Age: 37
End: 2022-01-20
Payer: COMMERCIAL

## 2022-01-20 DIAGNOSIS — R73.01 IMPAIRED FASTING GLUCOSE: ICD-10-CM

## 2022-01-20 DIAGNOSIS — R79.89 ABNORMAL LFTS: ICD-10-CM

## 2022-01-20 DIAGNOSIS — D69.6 THROMBOCYTOPENIA (H): ICD-10-CM

## 2022-01-20 DIAGNOSIS — F10.10 ALCOHOL ABUSE: ICD-10-CM

## 2022-01-20 DIAGNOSIS — E78.5 HYPERLIPIDEMIA LDL GOAL <100: ICD-10-CM

## 2022-01-20 LAB
ALBUMIN SERPL-MCNC: 3.5 G/DL (ref 3.5–5)
ALP SERPL-CCNC: 61 U/L (ref 45–120)
ALT SERPL W P-5'-P-CCNC: 92 U/L (ref 0–45)
ANION GAP SERPL CALCULATED.3IONS-SCNC: 9 MMOL/L (ref 5–18)
AST SERPL W P-5'-P-CCNC: 75 U/L (ref 0–40)
BILIRUB SERPL-MCNC: 0.8 MG/DL (ref 0–1)
BUN SERPL-MCNC: 10 MG/DL (ref 8–22)
CALCIUM SERPL-MCNC: 9.4 MG/DL (ref 8.5–10.5)
CHLORIDE BLD-SCNC: 105 MMOL/L (ref 98–107)
CHOLEST SERPL-MCNC: 193 MG/DL
CO2 SERPL-SCNC: 25 MMOL/L (ref 22–31)
CREAT SERPL-MCNC: 0.75 MG/DL (ref 0.7–1.3)
ERYTHROCYTE [DISTWIDTH] IN BLOOD BY AUTOMATED COUNT: 12.2 % (ref 10–15)
FASTING STATUS PATIENT QL REPORTED: YES
GFR SERPL CREATININE-BSD FRML MDRD: >90 ML/MIN/1.73M2
GGT SERPL-CCNC: 83 U/L (ref 0–50)
GLUCOSE BLD-MCNC: 116 MG/DL (ref 70–125)
HBA1C MFR BLD: 5.8 % (ref 0–5.6)
HCT VFR BLD AUTO: 42.7 % (ref 40–53)
HDLC SERPL-MCNC: 58 MG/DL
HGB BLD-MCNC: 14.8 G/DL (ref 13.3–17.7)
LDLC SERPL CALC-MCNC: 116 MG/DL
MCH RBC QN AUTO: 32.1 PG (ref 26.5–33)
MCHC RBC AUTO-ENTMCNC: 34.7 G/DL (ref 31.5–36.5)
MCV RBC AUTO: 93 FL (ref 78–100)
PLATELET # BLD AUTO: 75 10E3/UL (ref 150–450)
POTASSIUM BLD-SCNC: 4.6 MMOL/L (ref 3.5–5)
PROT SERPL-MCNC: 7.3 G/DL (ref 6–8)
RBC # BLD AUTO: 4.61 10E6/UL (ref 4.4–5.9)
SODIUM SERPL-SCNC: 139 MMOL/L (ref 136–145)
TRIGL SERPL-MCNC: 93 MG/DL
WBC # BLD AUTO: 3.7 10E3/UL (ref 4–11)

## 2022-01-20 PROCEDURE — 83036 HEMOGLOBIN GLYCOSYLATED A1C: CPT

## 2022-01-20 PROCEDURE — 80053 COMPREHEN METABOLIC PANEL: CPT

## 2022-01-20 PROCEDURE — 82977 ASSAY OF GGT: CPT

## 2022-01-20 PROCEDURE — 85027 COMPLETE CBC AUTOMATED: CPT

## 2022-01-20 PROCEDURE — 36415 COLL VENOUS BLD VENIPUNCTURE: CPT

## 2022-01-20 PROCEDURE — 80061 LIPID PANEL: CPT

## 2022-01-20 ASSESSMENT — PATIENT HEALTH QUESTIONNAIRE - PHQ9: SUM OF ALL RESPONSES TO PHQ QUESTIONS 1-9: 10

## 2022-01-20 ASSESSMENT — ANXIETY QUESTIONNAIRES: GAD7 TOTAL SCORE: 10

## 2022-08-04 DIAGNOSIS — F33.1 MODERATE EPISODE OF RECURRENT MAJOR DEPRESSIVE DISORDER (H): Primary | ICD-10-CM

## 2022-08-04 RX ORDER — BUPROPION HYDROCHLORIDE 150 MG/1
TABLET ORAL
Qty: 60 TABLET | Refills: 2 | Status: SHIPPED | OUTPATIENT
Start: 2022-08-04 | End: 2023-01-26

## 2022-08-05 ENCOUNTER — TELEPHONE (OUTPATIENT)
Dept: FAMILY MEDICINE | Facility: CLINIC | Age: 37
End: 2022-08-05

## 2022-08-05 NOTE — TELEPHONE ENCOUNTER
Central Prior Authorization Team   Phone: [phone]      [place] Initiation    Medication: buPROPion (WELLBUTRIN XL) 150 MG 24 hr tablet - INITIATED  Insurance Company: [employer] [place] - Phone [phone] Fax [phone]  Pharmacy Filling the Rx: [address] [place] [address]  Filling Pharmacy Phone: [phone]  Filling Pharmacy Fax:    Start Date: 8/5/2022

## 2022-08-05 NOTE — TELEPHONE ENCOUNTER
[place] Initiation    Medication: buPROPion (WELLBUTRIN XL) 150 MG 24 hr tablet  Insurance Company:  [employer]  Ph[facility] Filling the Rx:  [facility]  Filling Pharmacy Phone:  [phone]  Filling Pharmacy Fax:  [phone]  Start Date:  8/4/2022

## 2022-08-09 NOTE — TELEPHONE ENCOUNTER
Prior Authorization Approval    Authorization Effective Date: 8/8/2022  Authorization Expiration Date: 11/8/2022  Medication: buPROPion (WELLBUTRIN XL) 150 MG 24 hr tablet - APPROVED  [facility] Phone [phone] Fax [phone]  Which Pharmacy is filling the prescription (Not needed for infusion/clinic administered): [place] [address] [place] [address] - [address] [place] [address]  Pharmacy Notified: Yes  Patient Notified: Yes (pharmacy will notify patient when ready)

## 2022-08-22 ENCOUNTER — OFFICE VISIT (OUTPATIENT)
Dept: FAMILY MEDICINE | Facility: CLINIC | Age: 37
End: 2022-08-22
Payer: COMMERCIAL

## 2022-08-22 VITALS
RESPIRATION RATE: 18 BRPM | HEART RATE: 77 BPM | WEIGHT: 293.6 LBS | OXYGEN SATURATION: 97 % | DIASTOLIC BLOOD PRESSURE: 97 MMHG | BODY MASS INDEX: 35.74 KG/M2 | SYSTOLIC BLOOD PRESSURE: 154 MMHG | TEMPERATURE: 97.7 F
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
## 2022-08-22 DIAGNOSIS — S30.861A TICK BITE OF ABDOMEN, INITIAL ENCOUNTER: Primary | ICD-10-CM

## 2022-08-22 DIAGNOSIS — S30.861A INFECTED INSECT BITE OF ABDOMEN, INITIAL ENCOUNTER: ICD-10-CM

## 2022-08-22 DIAGNOSIS — L08.9 INFECTED INSECT BITE OF ABDOMEN, INITIAL ENCOUNTER: ICD-10-CM

## 2022-08-22 PROCEDURE — 99213 OFFICE O/P EST LOW 20 MIN: CPT | Performed by: NURSE PRACTITIONER

## 2022-08-22 RX ORDER — DOXYCYCLINE HYCLATE 100 MG
100 TABLET ORAL 2 TIMES DAILY
Qty: 20 TABLET | Refills: 0 | Status: SHIPPED | OUTPATIENT
Start: 2022-08-22 | End: 2022-09-01

## 2022-08-22 ASSESSMENT — ENCOUNTER SYMPTOMS
ARTHRALGIAS: 0
HEADACHES: 0
FEVER: 0
FATIGUE: 0
MYALGIAS: 0
WOUND: 1
APPETITE CHANGE: 0
ACTIVITY CHANGE: 0
CHILLS: 0

## 2022-08-23 NOTE — PROGRESS NOTES
Patient presents with:  Insect Bites: On the abdominal area possible deer tick bite- look angry and red- noticed it about 2 days ago and the red bump since last Monday      Clinical Decision Making: Focused exam positive for 1 circular pustule in mid abdomen to the left of the umbilical eye with a circular larger erythemic ring, tender to touch, mild warmth present, however no fluctuance.    Clinical presentation concerning for infected tick bite or other insect bite, will treat with antibiotic course [place]  Discussed importance of sun sensitivity with doxycycline antibiotic and avoiding alcohol, increase water hydration as well.  Discussed red flag symptoms of worsening wound infection and when to present for reevaluation.  Encourage follow-up with primary care provider in the next 2 weeks for a Lyme's panel.  Education provided.      ICD-10-CM    1. Tick bite of abdomen, initial encounter  S30.861A doxycycline hyclate (VIBRA-TABS) 100 MG tablet    W57.XXXA    2. Infected insect bite of abdomen, initial encounter  S30.861A doxycycline hyclate (VIBRA-TABS) 100 MG tablet    L08.9        Patient Instructions   Please continue to monitor for symptom improvement, and follow-up if not improving as discussed.      HPI: Siva Rowan is a 37 year old male who presents today complaining of an abdominal insect bite that appears to have 2 areas of circular redness, has become progressively more tender, and warm over the past week.  [place] and is unsure if this is a specific tick bite.  Reports pain is a 8-9/10 on the numeric pain scale, no OTC medication have been taken at this time.  Denies any headache, arthralgia, or myalgia symptoms.    History obtained from the patient.    Problem List:  2021-03: Thrombocytopenia (H)  2021-03: Elevated blood pressure reading without diagnosis of   hypertension  2021-03: Alcohol abuse  2020-01: Family history of ulcerative colitis  2020-01:  Diarrhea, unspecified type  2020: Anxiety disorder, unspecified type  2019: Abnormal LFTs  2019: Hepatic steatosis  2019: Impaired fasting glucose  2019: Class 1 obesity due to excess calories with serious   comorbidity and body mass index (BMI) of 34.0 to 34.9 in adult  2018: Smoking history      No past medical history on file.    Social History     Tobacco Use     Smoking status: Former Smoker     Packs/day: 0.75     Years: 15.00     Pack years: 11.25     Quit date: 2015     Years since quittin.6     Smokeless tobacco: Never Used   Substance Use Topics     Alcohol use: Yes       Review of Systems   Constitutional: Negative for activity change, appetite change, chills, fatigue and fever.   Musculoskeletal: Negative for arthralgias and myalgias.   Skin: Positive for wound.   Neurological: Negative for headaches.       Vitals:    22 1745   BP: (!) 154/97   BP Location: Right arm   Patient Position: Sitting   Cuff Size: Adult Large   Pulse: 77   Resp: 18   Temp: 97.7  F (36.5  C)   SpO2: 97%   Weight: 133.2 kg (293 lb 9.6 oz)       Physical Exam  Constitutional:       Appearance: Normal appearance. He is not ill-appearing or diaphoretic.   Cardiovascular:      Rate and Rhythm: Normal rate and regular rhythm.      Pulses: Normal pulses.      Heart sounds: Normal heart sounds.   Pulmonary:      Effort: Pulmonary effort is normal.      Breath sounds: Normal breath sounds.   Musculoskeletal:      Cervical back: Neck supple.   Skin:     General: Skin is warm.      Capillary Refill: Capillary refill takes less than 2 seconds.      Findings: Erythema present.      Comments: One circular pustule in mid abdomen to the left of the umbilical eye with a circular larger erythemic ring, approximately 2 x 3 cm in size.  Tender to touch, mild warmth present, however no fluctuance.     Neurological:      Mental Status: He is alert and oriented to person, place, and time.   Psychiatric:          Behavior: Behavior normal.         Labs:  No results found for any visits on 08/22/22.      At the end of the encounter, I discussed results, diagnosis, medications. Discussed red flags for immediate return to clinic/ER, as well as indications for follow up if no improvement. Patient understood and agreed to plan.     ALBERT Kirk CNP

## 2022-09-25 ENCOUNTER — HEALTH MAINTENANCE LETTER (OUTPATIENT)
Age: 37
End: 2022-09-25

## 2023-01-27 ENCOUNTER — TELEPHONE (OUTPATIENT)
Dept: FAMILY MEDICINE | Facility: CLINIC | Age: 38
End: 2023-01-27
Payer: COMMERCIAL

## 2023-01-27 NOTE — TELEPHONE ENCOUNTER
PA Initiation    Medication: Buproprion HCl ER (XL) 150mg ER tablets  Insurance Company:  [facility]  Pharmacy Filling the Rx:  [facility]  Filling Pharmacy Phone:  [phone]  Filling Pharmacy Fax:  [phone]  Start Date:  1/1/2023

## 2023-05-13 ENCOUNTER — HEALTH MAINTENANCE LETTER (OUTPATIENT)
Age: 38
End: 2023-05-13

## 2024-07-20 ENCOUNTER — HEALTH MAINTENANCE LETTER (OUTPATIENT)
Age: 39
End: 2024-07-20

## 2024-10-10 SDOH — HEALTH STABILITY: PHYSICAL HEALTH: ON AVERAGE, HOW MANY DAYS PER WEEK DO YOU ENGAGE IN MODERATE TO STRENUOUS EXERCISE (LIKE A BRISK WALK)?: 5 DAYS

## 2024-10-10 SDOH — HEALTH STABILITY: PHYSICAL HEALTH: ON AVERAGE, HOW MANY MINUTES DO YOU ENGAGE IN EXERCISE AT THIS LEVEL?: 30 MIN

## 2024-10-10 ASSESSMENT — SOCIAL DETERMINANTS OF HEALTH (SDOH): HOW OFTEN DO YOU GET TOGETHER WITH FRIENDS OR RELATIVES?: ONCE A WEEK

## 2024-10-14 ENCOUNTER — OFFICE VISIT (OUTPATIENT)
Dept: FAMILY MEDICINE | Facility: CLINIC | Age: 39
End: 2024-10-14
Payer: COMMERCIAL

## 2024-10-14 VITALS
HEART RATE: 70 BPM | WEIGHT: 304 LBS | RESPIRATION RATE: 13 BRPM | BODY MASS INDEX: 37.02 KG/M2 | OXYGEN SATURATION: 98 % | HEIGHT: 76 IN | SYSTOLIC BLOOD PRESSURE: 137 MMHG | DIASTOLIC BLOOD PRESSURE: 84 MMHG | TEMPERATURE: 98.1 F

## 2024-10-14 DIAGNOSIS — E66.812 CLASS 2 SEVERE OBESITY DUE TO EXCESS CALORIES WITH SERIOUS COMORBIDITY AND BODY MASS INDEX (BMI) OF 37.0 TO 37.9 IN ADULT (H): ICD-10-CM

## 2024-10-14 DIAGNOSIS — R73.01 IMPAIRED FASTING GLUCOSE: ICD-10-CM

## 2024-10-14 DIAGNOSIS — F10.10 ALCOHOL ABUSE: ICD-10-CM

## 2024-10-14 DIAGNOSIS — Z00.00 ROUTINE GENERAL MEDICAL EXAMINATION AT A HEALTH CARE FACILITY: Primary | ICD-10-CM

## 2024-10-14 DIAGNOSIS — R06.09 DYSPNEA ON EXERTION: ICD-10-CM

## 2024-10-14 DIAGNOSIS — Z13.220 SCREENING CHOLESTEROL LEVEL: ICD-10-CM

## 2024-10-14 DIAGNOSIS — R79.89 ABNORMAL LFTS: ICD-10-CM

## 2024-10-14 DIAGNOSIS — E66.01 CLASS 2 SEVERE OBESITY DUE TO EXCESS CALORIES WITH SERIOUS COMORBIDITY AND BODY MASS INDEX (BMI) OF 37.0 TO 37.9 IN ADULT (H): ICD-10-CM

## 2024-10-14 DIAGNOSIS — D69.6 THROMBOCYTOPENIA (H): ICD-10-CM

## 2024-10-14 DIAGNOSIS — K76.0 HEPATIC STEATOSIS: ICD-10-CM

## 2024-10-14 DIAGNOSIS — H00.014 HORDEOLUM EXTERNUM OF LEFT UPPER EYELID: ICD-10-CM

## 2024-10-14 DIAGNOSIS — F33.1 MODERATE EPISODE OF RECURRENT MAJOR DEPRESSIVE DISORDER (H): ICD-10-CM

## 2024-10-14 LAB
ALBUMIN SERPL BCG-MCNC: 3.8 G/DL (ref 3.5–5.2)
ALP SERPL-CCNC: 88 U/L (ref 40–150)
ALT SERPL W P-5'-P-CCNC: 71 U/L (ref 0–70)
ANION GAP SERPL CALCULATED.3IONS-SCNC: 9 MMOL/L (ref 7–15)
AST SERPL W P-5'-P-CCNC: 103 U/L (ref 0–45)
BILIRUB SERPL-MCNC: 1.3 MG/DL
BUN SERPL-MCNC: 6.5 MG/DL (ref 6–20)
CALCIUM SERPL-MCNC: 8.9 MG/DL (ref 8.8–10.4)
CHLORIDE SERPL-SCNC: 104 MMOL/L (ref 98–107)
CHOLEST SERPL-MCNC: 208 MG/DL
CREAT SERPL-MCNC: 0.57 MG/DL (ref 0.67–1.17)
EGFRCR SERPLBLD CKD-EPI 2021: >90 ML/MIN/1.73M2
ERYTHROCYTE [DISTWIDTH] IN BLOOD BY AUTOMATED COUNT: 12.3 % (ref 10–15)
EST. AVERAGE GLUCOSE BLD GHB EST-MCNC: 137 MG/DL
FASTING STATUS PATIENT QL REPORTED: YES
FASTING STATUS PATIENT QL REPORTED: YES
GLUCOSE SERPL-MCNC: 171 MG/DL (ref 70–99)
HBA1C MFR BLD: 6.4 % (ref 0–5.6)
HCO3 SERPL-SCNC: 24 MMOL/L (ref 22–29)
HCT VFR BLD AUTO: 43.3 % (ref 40–53)
HDLC SERPL-MCNC: 88 MG/DL
HGB BLD-MCNC: 15.2 G/DL (ref 13.3–17.7)
LDLC SERPL CALC-MCNC: 103 MG/DL
MCH RBC QN AUTO: 32.1 PG (ref 26.5–33)
MCHC RBC AUTO-ENTMCNC: 35.1 G/DL (ref 31.5–36.5)
MCV RBC AUTO: 91 FL (ref 78–100)
NONHDLC SERPL-MCNC: 120 MG/DL
PLATELET # BLD AUTO: 54 10E3/UL (ref 150–450)
POTASSIUM SERPL-SCNC: 4.5 MMOL/L (ref 3.4–5.3)
PROT SERPL-MCNC: 7.7 G/DL (ref 6.4–8.3)
RBC # BLD AUTO: 4.74 10E6/UL (ref 4.4–5.9)
SODIUM SERPL-SCNC: 137 MMOL/L (ref 135–145)
TRIGL SERPL-MCNC: 83 MG/DL
WBC # BLD AUTO: 2.8 10E3/UL (ref 4–11)

## 2024-10-14 PROCEDURE — 90471 IMMUNIZATION ADMIN: CPT | Performed by: NURSE PRACTITIONER

## 2024-10-14 PROCEDURE — 90656 IIV3 VACC NO PRSV 0.5 ML IM: CPT | Performed by: NURSE PRACTITIONER

## 2024-10-14 PROCEDURE — 36415 COLL VENOUS BLD VENIPUNCTURE: CPT | Performed by: NURSE PRACTITIONER

## 2024-10-14 PROCEDURE — 91320 SARSCV2 VAC 30MCG TRS-SUC IM: CPT | Performed by: NURSE PRACTITIONER

## 2024-10-14 PROCEDURE — 90480 ADMN SARSCOV2 VAC 1/ONLY CMP: CPT | Performed by: NURSE PRACTITIONER

## 2024-10-14 PROCEDURE — 99214 OFFICE O/P EST MOD 30 MIN: CPT | Mod: 25 | Performed by: NURSE PRACTITIONER

## 2024-10-14 PROCEDURE — 85027 COMPLETE CBC AUTOMATED: CPT | Performed by: NURSE PRACTITIONER

## 2024-10-14 PROCEDURE — 80061 LIPID PANEL: CPT | Performed by: NURSE PRACTITIONER

## 2024-10-14 PROCEDURE — 99396 PREV VISIT EST AGE 40-64: CPT | Mod: 25 | Performed by: NURSE PRACTITIONER

## 2024-10-14 PROCEDURE — 83036 HEMOGLOBIN GLYCOSYLATED A1C: CPT | Performed by: NURSE PRACTITIONER

## 2024-10-14 PROCEDURE — 96127 BRIEF EMOTIONAL/BEHAV ASSMT: CPT | Performed by: NURSE PRACTITIONER

## 2024-10-14 PROCEDURE — 80053 COMPREHEN METABOLIC PANEL: CPT | Performed by: NURSE PRACTITIONER

## 2024-10-14 RX ORDER — OFLOXACIN 3 MG/ML
1-2 SOLUTION/ DROPS OPHTHALMIC 4 TIMES DAILY
Qty: 5 ML | Refills: 0 | Status: SHIPPED | OUTPATIENT
Start: 2024-10-14 | End: 2024-10-21

## 2024-10-14 RX ORDER — IBUPROFEN 800 MG/1
800 TABLET, FILM COATED ORAL EVERY 8 HOURS PRN
COMMUNITY
Start: 2023-10-19

## 2024-10-14 ASSESSMENT — PAIN SCALES - GENERAL: PAINLEVEL: NO PAIN (0)

## 2024-10-14 ASSESSMENT — PATIENT HEALTH QUESTIONNAIRE - PHQ9
SUM OF ALL RESPONSES TO PHQ QUESTIONS 1-9: 2
10. IF YOU CHECKED OFF ANY PROBLEMS, HOW DIFFICULT HAVE THESE PROBLEMS MADE IT FOR YOU TO DO YOUR WORK, TAKE CARE OF THINGS AT HOME, OR GET ALONG WITH OTHER PEOPLE: NOT DIFFICULT AT ALL
SUM OF ALL RESPONSES TO PHQ QUESTIONS 1-9: 2

## 2024-10-14 NOTE — PROGRESS NOTES
Preventive Care Visit  [facility]  ALBERT Blanco CNP, Family Medicine  Oct 14, 2024  {Provider  Link to SmartSet :388237}    {PROVIDER CHARTING PREFERENCE:388897}    Subjective   Shen is a 39 year old, presenting for the following:  Physical (Weight gain diabetic concerns preventative care- look at eye)    {(!) Visit Details have not yet been documented.  Please enter Visit Details and then use this list to pull in documentation. (Optional):128645}     Health Care Directive  Patient does not have a Health Care Directive or Living Will: {ADVANCE_DIRECTIVE_STATUS:335277}    HPI  ***  {MA/LPN/RN Pre-Provider Visit Orders- hCG/UA/Strep (Optional):495903}  {SUPERLIST (Optional):593843}  {additonal problems for provider to add (Optional):833146}      10/10/2024   General Health   How would you rate your overall physical health? Good   Feel stress (tense, anxious, or unable to sleep) To some extent      (!) STRESS CONCERN      10/10/2024   Nutrition   Three or more servings of calcium each day? Yes   Diet: Regular (no restrictions)   How many servings of fruit and vegetables per day? (!) 2-3   How many sweetened beverages each day? 0-1            10/10/2024   Exercise   Days per week of moderate/strenous exercise 5 days   Average minutes spent exercising at this level 30 min            10/10/2024   Social Factors   Frequency of gathering with friends or relatives Once a week   Worry food won't last until get money to buy more No   Food not last or not have enough money for food? No   Do you have housing? (Housing is defined as stable permanent housing and does not include staying ouside in a car, in a tent, in an abandoned building, in an overnight shelter, or couch-surfing.) Yes   Are you worried about losing your housing? No   Lack of transportation? No   Unable to get utilities (heat,electricity)? No            10/10/2024   Dental   Dentist two times every year? (!) NO            10/10/2024    TB Screening   Were you born outside of the [place]? No          { Rooming Staff Patient needs a PHQ as part of the AWV.  Use this link to complete and then refresh the note to pull results Link to PHQ9 Assessment :646817}  {USE TO PULL IN PHQ RESULTS FOR TODAY:670605}        10/10/2024   Substance Use   Alcohol more than 3/day or more than 7/wk Yes   How often do you have a drink containing alcohol 4 or more times a week   How many alcohol drinks on typical day 5 or 6   How often do you have 5+ drinks at one occasion Daily or almost daily   Audit 2/3 Score 6   How often not able to stop drinking once started Never   How often failed to do what normally expected Never   How often needed first drink in am after a heavy drinking session Never   How often feeling of guilt or remorse after drinking Never   How often unable to remember what happened the night before Less than monthly   Have you or someone else been injured because of your drinking No   Has anyone been concerned or suggested you cut down on drinking No   TOTAL SCORE - AUDIT 11   Do you use any other substances recreationally? (!) CANNABIS PRODUCTS        Social History     Tobacco Use    Smoking status: Former     Current packs/day: 0.00     Average packs/day: 0.8 packs/day for 15.0 years (11.3 ttl pk-yrs)     Types: Cigarettes     Start date: 2000     Quit date: 2015     Years since quittin.7    Smokeless tobacco: Never   Substance Use Topics    Alcohol use: Yes    Drug use: No     {Provider  If there are gaps in the social history shown above, please follow the link to update and then refresh the note Link to Social and Substance History :578341}      10/10/2024   STI Screening   New sexual partner(s) since last STI/HIV test? No      ASCVD Risk   The 10-year ASCVD risk score (Shan ALCARAZ, et al., 2019) is: 0.9%    Values used to calculate the score:      Age: 39 years      Sex: Male      Is Non- : No       "Diabetic: No      Tobacco smoker: No      Systolic Blood Pressure: 137 mmHg      Is BP treated: No      HDL Cholesterol: 58 mg/dL      Total Cholesterol: 193 mg/dL        10/10/2024   Contraception/Family Planning   Questions about contraception or family planning No        {Provider  REQUIRED FOR AWV Use the storyboard to review patient history, after sections have been marked as reviewed, refresh note to capture documentation:760817}   Reviewed and updated as needed this visit by Provider                    {HISTORY OPTIONS (Optional):737772}    {ROS Picklists (Optional):271431}     Objective    Exam  /84 (BP Location: Right arm, Patient Position: Sitting, Cuff Size: Adult Large)   Pulse 70   Temp 98.1  F (36.7  C) (Oral)   Resp 13   Ht 1.918 m (6' 3.51\")   Wt 137.9 kg (304 lb)   SpO2 98%   BMI 37.48 kg/m     Estimated body mass index is 37.48 kg/m  as calculated from the following:    Height as of this encounter: 1.918 m (6' 3.51\").    Weight as of this encounter: 137.9 kg (304 lb).    Physical Exam  {Exam Choices (Optional):844880}        Signed Electronically by: ALBERT Blacno CNP  {Email feedback regarding this note to [email or web address]   :565992}  Answers submitted by the patient for this visit:  Patient Health Questionnaire (Submitted on 10/14/2024)  If you checked off any problems, how difficult have these problems made it for you to do your work, take care of things at home, or get along with other people?: Not difficult at all  PHQ9 TOTAL SCORE: 2    "

## 2024-10-14 NOTE — PROGRESS NOTES
Preventive Care Visit  [facility]  ALBERT Blanco CNP, Family Medicine  Oct 14, 2024      Assessment & Plan     Routine general medical examination at a health care facility  Annual physical examination completed today.  Reviewed importance of healthy diet, regular exercise and encouraged patient to abstain from alcohol and marijuana use.  COVID and influenza vaccines will be administered today.  Lab work will be obtained as noted below.  I will follow-up with results when available.  - CBC with platelets  - COVID-19 12+ (PFIZER)  - INFLUENZA VACCINE, SPLIT VIRUS, TRIVALENT,PF (FLUZONE\FLUARIX)    Class 2 severe obesity due to excess calories with serious comorbidity and body mass index (BMI) of 37.0 to 37.9 in adult (H)  Patient has gained weight over the last year.  I encouraged diet and exercise modifications to promote weight loss.  Weight loss.    Moderate episode of recurrent major depressive disorder (H)  Anxiety  The patient feels that depression and anxiety has been well-controlled without need for medication.  We discussed options for medication management which patient declines today.  He is advised to monitor and notify us of any worsening symptoms.    Impaired fasting glucose  History of impaired fasting glucose noted.  Will check hemoglobin A1c today.  - Hemoglobin A1c  - Hemoglobin A1c    Alcohol abuse  Abnormal LFTs  History of abnormal LFTs.  We discussed importance of abstaining from alcohol use.  Offered resources which patient declines today.  I will check metabolic panel today.  - Comprehensive metabolic panel (BMP + Alb, Alk Phos, ALT, AST, Total. Bili, TP)  - Comprehensive metabolic panel (BMP + Alb, Alk Phos, ALT, AST, Total. Bili, TP)    Screening cholesterol level  Will check cholesterol level today.  As above, encouraged healthy diet and regular exercise.  - Lipid panel reflex to direct LDL Fasting  - Lipid panel reflex to direct LDL Fasting    Dyspnea on  "exertion  Occasional episodes of dyspnea on exertion.  He does have a family history of MI in his grandfather.  I will send for stress test for safe measures and refer to cardiology.  We reviewed symptoms that would warrant immediate ER evaluation.  - Exercise Stress Test - Adult    Hordeolum externum of left upper eyelid  Patient has had ongoing symptoms of stye.  Prescription for ofloxacin placed today.  Encouraged continued warm compresses, washing with gentle soap etc.  - ofloxacin (OCUFLOX) 0.3 % ophthalmic solution  Dispense: 5 mL; Refill: 0    Thrombocytopenia.  History of thrombocytopenia noted.  Will check blood count today.  -Complete blood count    BMI  Estimated body mass index is 37.48 kg/m  as calculated from the following:    Height as of this encounter: 1.918 m (6' 3.51\").    Weight as of this encounter: 137.9 kg (304 lb).   Weight management plan: Discussed healthy diet and exercise guidelines    Counseling  Appropriate preventive services were addressed with this patient via screening, questionnaire, or discussion as appropriate for fall prevention, nutrition, physical activity, Tobacco-use cessation, social engagement, weight loss and cognition.  Checklist reviewing preventive services available has been given to the patient.  Reviewed patient's diet, addressing concerns and/or questions.   The patient was instructed to see the dentist every 6 months.   The patient reports drinking more than 3 alcoholic drinks per day and/or more than 7 drhnks per week. The patient was counseled and given information about possible harmful effects of excessive alcohol intake.    Work on weight loss  Regular exercise    Subjective   Shen is a 39 year old, presenting for the following:  Physical (Weight gain diabetic concerns preventative care- look at eye)        10/14/2024     9:44 AM   Additional Questions   Roomed by [facility] Care Directive  Patient does not have a Health Care Directive or Living Will: " Discussed advance care planning with patient; information given to patient to review.    HPI  Patient presents today for annual sickle examination.  Medical history is significant for obesity, alcohol abuse, elevated LFTs, impaired fasting glucose, anxiety and depression.  He states that he has been doing well from an anxiety and depression standpoint.  He was prescribed Wellbutrin and states that he stopped taking this after about 3 months as he did not feel it was helping and he did not feel well on it.  This was about 6 months ago that he stopped.  He feels that he is in a good place mentally and does not feel the need for an alternative medication at this time.  He continues to drink alcohol daily, approximately 5-6 beers with some hard liquor on occasion.  He has history of impaired fasting glucose is concerned about this stating he has not had the best diet or schedule of eating.  He typically eats late at night.  He is smoking marijuana on a daily basis as well.  States that this helps with his anxiety level.  Denies any other recreational drug use.  No cigarette use.    He has had some shortness of breath with activity that is new for him.  He denies any obvious chest pain though he sometimes does get some discomfort when laying down at night.  His grandfather passed away from a heart attack in his 60s.    We reviewed patient's family history with maternal uncle who has ulcerative colitis.  Patient did have a colonoscopy a few years ago and states that this was normal.  He is recommended to have colonoscopy again at age 44.        10/10/2024   General Health   How would you rate your overall physical health? Good   Feel stress (tense, anxious, or unable to sleep) To some extent      (!) STRESS CONCERN      10/10/2024   Nutrition   Three or more servings of calcium each day? Yes   Diet: Regular (no restrictions)   How many servings of fruit and vegetables per day? (!) 2-3   How many sweetened beverages each  day? 0-1            10/10/2024   Exercise   Days per week of moderate/strenous exercise 5 days   Average minutes spent exercising at this level 30 min            10/10/2024   Social Factors   Frequency of gathering with friends or relatives Once a week   Worry food won't last until get money to buy more No   Food not last or not have enough money for food? No   Do you have housing? (Housing is defined as stable permanent housing and does not include staying ouside in a car, in a tent, in an abandoned building, in an overnight shelter, or couch-surfing.) Yes   Are you worried about losing your housing? No   Lack of transportation? No   Unable to get utilities (heat,electricity)? No            10/10/2024   Dental   Dentist two times every year? (!) NO            10/10/2024   TB Screening   Were you born outside of the [place]? No          Today's PHQ-9 Score:       10/14/2024     9:43 AM   PHQ-9 SCORE   PHQ-9 Total Score MyChart 2 (Minimal depression)   PHQ-9 Total Score 2         10/10/2024   Substance Use   Alcohol more than 3/day or more than 7/wk Yes   How often do you have a drink containing alcohol 4 or more times a week   How many alcohol drinks on typical day 5 or 6   How often do you have 5+ drinks at one occasion Daily or almost daily   Audit 2/3 Score 6   How often not able to stop drinking once started Never   How often failed to do what normally expected Never   How often needed first drink in am after a heavy drinking session Never   How often feeling of guilt or remorse after drinking Never   How often unable to remember what happened the night before Less than monthly   Have you or someone else been injured because of your drinking No   Has anyone been concerned or suggested you cut down on drinking No   TOTAL SCORE - AUDIT 11   Do you use any other substances recreationally? (!) CANNABIS PRODUCTS        Social History     Tobacco Use    Smoking status: Former     Current packs/day: 0.00     Average  "packs/day: 0.8 packs/day for 15.0 years (11.3 ttl pk-yrs)     Types: Cigarettes     Start date: 2000     Quit date: 2015     Years since quittin.7    Smokeless tobacco: Never   Substance Use Topics    Alcohol use: Yes    Drug use: No           10/10/2024   STI Screening   New sexual partner(s) since last STI/HIV test? No      ASCVD Risk   The 10-year ASCVD risk score (Shan ALCARAZ, et al., 2019) is: 1.2%    Values used to calculate the score:      Age: 39 years      Sex: Male      Is Non- : No      Diabetic: No      Tobacco smoker: No      Systolic Blood Pressure: 157 mmHg      Is BP treated: No      HDL Cholesterol: 58 mg/dL      Total Cholesterol: 193 mg/dL        10/10/2024   Contraception/Family Planning   Questions about contraception or family planning No           Reviewed and updated as needed this visit by Provider                    No past medical history on file.  No past surgical history on file.      Review of Systems  Constitutional, HEENT, cardiovascular, pulmonary, gi and gu systems are negative, except as otherwise noted.     Objective    Exam  BP (!) 157/86 (BP Location: Right arm, Patient Position: Sitting, Cuff Size: Adult Large)   Pulse 70   Temp 98.1  F (36.7  C) (Oral)   Resp 13   Ht 1.918 m (6' 3.51\")   Wt 137.9 kg (304 lb)   SpO2 98%   BMI 37.48 kg/m     Estimated body mass index is 37.48 kg/m  as calculated from the following:    Height as of this encounter: 1.918 m (6' 3.51\").    Weight as of this encounter: 137.9 kg (304 lb).    Physical Exam  GENERAL: alert and no distress  EYES: Eyes grossly normal to inspection, PERRL and conjunctivae and sclerae normal  HENT: ear canals and TM's normal, nose and mouth without ulcers or lesions  NECK: no adenopathy, no asymmetry, masses, or scars  RESP: lungs clear to auscultation - no rales, rhonchi or wheezes  CV: regular rate and rhythm, normal S1 S2, no S3 or S4, no murmur, click or rub, no " peripheral edema  ABDOMEN: soft, nontender, no hepatosplenomegaly, no masses and bowel sounds normal  MS: no gross musculoskeletal defects noted, no edema  SKIN: no suspicious lesions or rashes  NEURO: Normal strength and tone, mentation intact and speech normal  PSYCH: mentation appears normal, affect normal/bright        Signed Electronically by: ALBERT Blanco CNP    Answers submitted by the patient for this visit:  Patient Health Questionnaire (Submitted on 10/14/2024)  If you checked off any problems, how difficult have these problems made it for you to do your work, take care of things at home, or get along with other people?: Not difficult at all  PHQ9 TOTAL SCORE: 2

## 2024-10-14 NOTE — PATIENT INSTRUCTIONS
Patient Education   Preventive Care Advice   This is general advice given by our system to help you stay healthy. However, your care team may have specific advice just for you. Please talk to your care team about your preventive care needs.  Nutrition  Eat 5 or more servings of fruits and vegetables each day.  Try wheat bread, brown rice and whole grain pasta (instead of white bread, rice, and pasta).  Get enough calcium and vitamin D. Check the label on foods and aim for 100% of the RDA (recommended daily allowance).  Lifestyle  Exercise at least 150 minutes each week  (30 minutes a day, 5 days a week).  Do muscle strengthening activities 2 days a week. These help control your weight and prevent disease.  No smoking.  Wear sunscreen to prevent skin cancer.  Have a dental exam and cleaning every 6 months.  Yearly exams  See your health care team every year to talk about:  Any changes in your health.  Any medicines your care team has prescribed.  Preventive care, family planning, and ways to prevent chronic diseases.  Shots (vaccines)   HPV shots (up to age 26), if you've never had them before.  Hepatitis B shots (up to age 59), if you've never had them before.  COVID-19 shot: Get this shot when it's due.  Flu shot: Get a flu shot every year.  Tetanus shot: Get a tetanus shot every 10 years.  Pneumococcal, hepatitis A, and RSV shots: Ask your care team if you need these based on your risk.  Shingles shot (for age 50 and up)  General health tests  Diabetes screening:  Starting at age 35, Get screened for diabetes at least every 3 years.  If you are younger than age 35, ask your care team if you should be screened for diabetes.  Cholesterol test: At age 39, start having a cholesterol test every 5 years, or more often if advised.  Bone density scan (DEXA): At age 50, ask your care team if you should have this scan for osteoporosis (brittle bones).  Hepatitis C: Get tested at least once in your life.  STIs (sexually  transmitted infections)  Before age 24: Ask your care team if you should be screened for STIs.  After age 24: Get screened for STIs if you're at risk. You are at risk for STIs (including HIV) if:  You are sexually active with more than one person.  You don't use condoms every time.  You or a partner was diagnosed with a sexually transmitted infection.  If you are at risk for HIV, ask about PrEP medicine to prevent HIV.  Get tested for HIV at least once in your life, whether you are at risk for HIV or not.  Cancer screening tests  Cervical cancer screening: If you have a cervix, begin getting regular cervical cancer screening tests starting at age 21.  Breast cancer scan (mammogram): If you've ever had breasts, begin having regular mammograms starting at age 40. This is a scan to check for breast cancer.  Colon cancer screening: It is important to start screening for colon cancer at age 45.  Have a colonoscopy test every 10 years (or more often if you're at risk) Or, ask your provider about stool tests like a FIT test every year or Cologuard test every 3 years.  To learn more about your testing options, visit:   .  For help making a decision, visit:   https://bit.ly/bi78713.  Prostate cancer screening test: If you have a prostate, ask your care team if a prostate cancer screening test (PSA) at age 55 is right for you.  Lung cancer screening: If you are a current or former smoker ages 50 to 80, ask your care team if ongoing lung cancer screenings are right for you.  For informational purposes only. Not to replace the advice of your health care provider. Copyright   2023 [facility]. All rights reserved. Clinically reviewed by the [facility]. [employer] 564880 - REV 01/24.  9 Ways to Cut Back on Drinking  Maybe you've found yourself drinking more alcohol than you'd prefer. If you want to cut back, here are some ideas to try.    Think before you drink.  Do you really want a drink,  "or is it just a habit? If you're used to having a drink at a certain time, try doing something else then.     Look for substitutes.  Find some no-alcohol drinks that you enjoy, like flavored seltzer water, tea with honey, or tonic with a slice of lime. Or try alcohol-free beer or \"virgin\" cocktails (without the alcohol).     Drink more water.  Use water to quench your thirst. Drink a glass of water before you have any alcohol. Have another glass along with every drink or between drinks.     Shrink your drink.  For example, have a bottle of beer instead of a pint. Use a smaller glass for wine. Choose drinks with lower alcohol content (ABV%). Or use less liquor and more mixer in cocktails.     Slow down.  It's easy to drink quickly and without thinking about it. Pay attention, and make each drink last longer.     Do the math.  Total up how much you spend on alcohol each month. How much is that a year? If you cut back, what could you do with the money you save?     Take a break.  Choose a day or two each week when you won't drink at all. Notice how you feel on those days, physically and emotionally. How did you sleep? Do you feel better? Over time, add more break days.     Count calories.  Would you like to lose some weight? For some people that's a good motivator for cutting back. Figure out how many calories are in each drink. How many does that add up to in a day? In a week? In a month?     Practice saying no.  Be ready when someone offers you a drink. Try: \"Thanks, I've had enough.\" Or \"Thanks, but I'm cutting back.\" Or \"No, thanks. I feel better when I drink less.\"   Current as of: November 15, 2023  Content Version: 14.2 2024 [employer].   Care instructions adapted under license by your healthcare professional. If you have questions about a medical condition or this instruction, always ask your healthcare professional. [employer] disclaims any warranty or liability for your use of " this information.  Substance Use Disorder: Care Instructions  Overview     You can improve your life and health by stopping your use of alcohol or drugs. When you don't drink or use drugs, you may feel and sleep better. You may get along better with your family, friends, and coworkers. There are medicines and programs that can help with substance use disorder.  How can you care for yourself at home?  Here are some ways to help you stay sober and prevent relapse.  If you have been given medicine to help keep you sober or reduce your cravings, be sure to take it exactly as prescribed.  Talk to your doctor about programs that can help you stop using drugs or drinking alcohol.  Do not keep alcohol or drugs in your home.  Plan ahead. Think about what you'll say if other people ask you to drink or use drugs. Try not to spend time with people who drink or use drugs.  Use the time and money spent on drinking or drugs to do something that's important to you.  Preventing a relapse  Have a plan to deal with relapse. Learn to recognize changes in your thinking that lead you to drink or use drugs. Get help before you start to drink or use drugs again.  Try to stay away from situations, friends, or places that may lead you to drink or use drugs.  If you feel the need to drink alcohol or use drugs again, seek help right away. Call a trusted friend or family member. Some people get support from organizations such as Narcotics Anonymous or [employer] or from treatment facilities.  If you relapse, get help as soon as you can. Some people make a plan with another person that outlines what they want that person to do for them if they relapse. The plan usually includes how to handle the relapse and who to notify in case of relapse.  Don't give up. Remember that a relapse doesn't mean that you have failed. Use the experience to learn the triggers that lead you to drink or use drugs. Then quit again. Recovery is a lifelong process.  "Many people have several relapses before they are able to quit for good.  Follow-up care is a key part of your treatment and safety. Be sure to make and go to all appointments, and call your doctor if you are having problems. It's also a good idea to know your test results and keep a list of the medicines you take.  When should you call for help?   Call 911  anytime you think you may need emergency care. For example, call if you or someone else:    Has overdosed or has withdrawal signs. Be sure to tell the emergency workers that you are or someone else is using or trying to quit using drugs. Overdose or withdrawal signs may include:  Losing consciousness.  Seizure.  Seeing or hearing things that aren't there (hallucinations).     Is thinking or talking about suicide or harming others.   Where to get help 24 hours a day, 7 days a week   If you or someone you know talks about suicide, self-harm, a mental health crisis, a substance use crisis, or any other kind of emotional distress, get help right away. You can:    Call the Suicide and Crisis Lifeline at 988.     Call 5-801-797-WBEM (1-960.640.3873).     Text HOME to 373028 to access the Crisis Text Line.   Consider saving these numbers in your phone.  Go to [employer]org for more information or to chat online.  Call your doctor now or seek immediate medical care if:    You are having withdrawal symptoms. These may include nausea or vomiting, sweating, shakiness, and anxiety.   Watch closely for changes in your health, and be sure to contact your doctor if:    You have a relapse.     You need more help or support to stop.   Where can you learn more?  Go to https://www.health[facility]ara [facility].net/patiented  Enter H573 in the search box to learn more about \"Substance Use Disorder: Care Instructions.\"  Current as of: November 15, 2023  Content Version: 14.2 2024 [employer].   Care instructions adapted under license by your healthcare professional. If you have " questions about a medical condition or this instruction, always ask your healthcare professional. [employer] disclaims any warranty or liability for your use of this information.

## 2024-10-23 ENCOUNTER — HOSPITAL ENCOUNTER (OUTPATIENT)
Dept: CARDIOLOGY | Facility: CLINIC | Age: 39
Discharge: HOME | End: 2024-10-23
Attending: NURSE PRACTITIONER | Admitting: NURSE PRACTITIONER
Payer: COMMERCIAL

## 2024-10-23 DIAGNOSIS — R06.09 DYSPNEA ON EXERTION: ICD-10-CM

## 2024-10-23 LAB
CV STRESS CURRENT BP HE: NORMAL
CV STRESS CURRENT HR HE: 101
CV STRESS CURRENT HR HE: 102
CV STRESS CURRENT HR HE: 102
CV STRESS CURRENT HR HE: 103
CV STRESS CURRENT HR HE: 105
CV STRESS CURRENT HR HE: 110
CV STRESS CURRENT HR HE: 112
CV STRESS CURRENT HR HE: 113
CV STRESS CURRENT HR HE: 124
CV STRESS CURRENT HR HE: 127
CV STRESS CURRENT HR HE: 128
CV STRESS CURRENT HR HE: 131
CV STRESS CURRENT HR HE: 137
CV STRESS CURRENT HR HE: 140
CV STRESS CURRENT HR HE: 140
CV STRESS CURRENT HR HE: 73
CV STRESS CURRENT HR HE: 82
CV STRESS CURRENT HR HE: 83
CV STRESS CURRENT HR HE: 88
CV STRESS CURRENT HR HE: 89
CV STRESS CURRENT HR HE: 92
CV STRESS CURRENT HR HE: 92
CV STRESS CURRENT HR HE: 93
CV STRESS CURRENT HR HE: 95
CV STRESS DEVIATION TIME HE: NORMAL
CV STRESS ECHO PERCENT HR HE: NORMAL
CV STRESS EXERCISE STAGE HE: NORMAL
CV STRESS EXERCISE STAGE REACHED HE: NORMAL
CV STRESS FINAL RESTING BP HE: NORMAL
CV STRESS FINAL RESTING HR HE: 88
CV STRESS MAX HR HE: 141
CV STRESS MAX TREADMILL GRADE HE: 16
CV STRESS MAX TREADMILL SPEED HE: 4.2
CV STRESS PEAK DIA BP HE: NORMAL
CV STRESS PEAK SYS BP HE: NORMAL
CV STRESS PHASE HE: NORMAL
CV STRESS PROTOCOL HE: NORMAL
CV STRESS REASON STOPPED HE: NORMAL
CV STRESS RESTING PT POSITION HE: NORMAL
CV STRESS RESTING PT POSITION HE: NORMAL
CV STRESS ST DEVIATION AMOUNT HE: NORMAL
CV STRESS ST DEVIATION ELEVATION HE: NORMAL
CV STRESS ST EVELATION AMOUNT HE: NORMAL
CV STRESS SYMPTOMS HE: NORMAL
CV STRESS TEST TYPE HE: NORMAL
CV STRESS TOTAL STAGE TIME MIN 1 HE: NORMAL
STRESS ECHO BASELINE DIASTOLIC HE: 90
STRESS ECHO BASELINE HR: 77
STRESS ECHO BASELINE SYSTOLIC BP: 152
STRESS ECHO LAST STRESS DIASTOLIC BP: 80
STRESS ECHO LAST STRESS HR: 140
STRESS ECHO LAST STRESS SYSTOLIC BP: 178
STRESS ECHO POST ESTIMATED WORKLOAD: 11.5
STRESS ECHO POST EXERCISE DUR MIN: 9
STRESS ECHO POST EXERCISE DUR SEC: 46
STRESS ECHO TARGET HR: 153

## 2024-10-23 PROCEDURE — 93016 CV STRESS TEST SUPVJ ONLY: CPT | Performed by: INTERNAL MEDICINE

## 2024-10-23 PROCEDURE — 93017 CV STRESS TEST TRACING ONLY: CPT

## 2024-10-23 PROCEDURE — 93018 CV STRESS TEST I&R ONLY: CPT | Performed by: INTERNAL MEDICINE

## 2024-10-28 DIAGNOSIS — R06.09 DYSPNEA ON EXERTION: Primary | ICD-10-CM

## 2024-10-28 DIAGNOSIS — Z82.49 FAMILY HISTORY OF ISCHEMIC HEART DISEASE: ICD-10-CM

## 2025-05-19 ENCOUNTER — OFFICE VISIT (OUTPATIENT)
Dept: FAMILY MEDICINE | Facility: CLINIC | Age: 40
End: 2025-05-19
Payer: COMMERCIAL

## 2025-05-19 ENCOUNTER — MYC MEDICAL ADVICE (OUTPATIENT)
Dept: FAMILY MEDICINE | Facility: CLINIC | Age: 40
End: 2025-05-19

## 2025-05-19 VITALS
DIASTOLIC BLOOD PRESSURE: 97 MMHG | HEART RATE: 81 BPM | WEIGHT: 297 LBS | HEIGHT: 76 IN | SYSTOLIC BLOOD PRESSURE: 155 MMHG | BODY MASS INDEX: 36.17 KG/M2 | RESPIRATION RATE: 20 BRPM | OXYGEN SATURATION: 98 % | TEMPERATURE: 98.3 F

## 2025-05-19 DIAGNOSIS — E66.811 CLASS 1 OBESITY DUE TO EXCESS CALORIES WITH SERIOUS COMORBIDITY AND BODY MASS INDEX (BMI) OF 34.0 TO 34.9 IN ADULT: Primary | ICD-10-CM

## 2025-05-19 DIAGNOSIS — E66.812 CLASS 2 SEVERE OBESITY DUE TO EXCESS CALORIES WITH SERIOUS COMORBIDITY AND BODY MASS INDEX (BMI) OF 37.0 TO 37.9 IN ADULT (H): ICD-10-CM

## 2025-05-19 DIAGNOSIS — E66.01 CLASS 2 SEVERE OBESITY DUE TO EXCESS CALORIES WITH SERIOUS COMORBIDITY AND BODY MASS INDEX (BMI) OF 37.0 TO 37.9 IN ADULT (H): ICD-10-CM

## 2025-05-19 DIAGNOSIS — R73.03 PREDIABETES: ICD-10-CM

## 2025-05-19 DIAGNOSIS — E66.09 CLASS 1 OBESITY DUE TO EXCESS CALORIES WITH SERIOUS COMORBIDITY AND BODY MASS INDEX (BMI) OF 34.0 TO 34.9 IN ADULT: Primary | ICD-10-CM

## 2025-05-19 DIAGNOSIS — R21 RASH: ICD-10-CM

## 2025-05-19 DIAGNOSIS — K76.0 HEPATIC STEATOSIS: ICD-10-CM

## 2025-05-19 DIAGNOSIS — L98.9 SKIN LESION: ICD-10-CM

## 2025-05-19 LAB
ALBUMIN SERPL BCG-MCNC: 3.6 G/DL (ref 3.5–5.2)
ALP SERPL-CCNC: 82 U/L (ref 40–150)
ALT SERPL W P-5'-P-CCNC: 88 U/L (ref 0–70)
ANION GAP SERPL CALCULATED.3IONS-SCNC: 9 MMOL/L (ref 7–15)
AST SERPL W P-5'-P-CCNC: 113 U/L (ref 0–45)
BILIRUB SERPL-MCNC: 1.1 MG/DL
BUN SERPL-MCNC: 7 MG/DL (ref 6–20)
CALCIUM SERPL-MCNC: 8.9 MG/DL (ref 8.8–10.4)
CHLORIDE SERPL-SCNC: 104 MMOL/L (ref 98–107)
CHOLEST SERPL-MCNC: 217 MG/DL
CREAT SERPL-MCNC: 0.51 MG/DL (ref 0.67–1.17)
EGFRCR SERPLBLD CKD-EPI 2021: >90 ML/MIN/1.73M2
EST. AVERAGE GLUCOSE BLD GHB EST-MCNC: 134 MG/DL
FASTING STATUS PATIENT QL REPORTED: ABNORMAL
FASTING STATUS PATIENT QL REPORTED: ABNORMAL
GLUCOSE SERPL-MCNC: 206 MG/DL (ref 70–99)
HBA1C MFR BLD: 6.3 % (ref 0–5.6)
HCO3 SERPL-SCNC: 25 MMOL/L (ref 22–29)
HDLC SERPL-MCNC: 90 MG/DL
LDLC SERPL CALC-MCNC: 106 MG/DL
NONHDLC SERPL-MCNC: 127 MG/DL
POTASSIUM SERPL-SCNC: 4.5 MMOL/L (ref 3.4–5.3)
PROT SERPL-MCNC: 7.8 G/DL (ref 6.4–8.3)
SODIUM SERPL-SCNC: 138 MMOL/L (ref 135–145)
TRIGL SERPL-MCNC: 103 MG/DL
TSH SERPL DL<=0.005 MIU/L-ACNC: 1.51 UIU/ML (ref 0.3–4.2)

## 2025-05-19 PROCEDURE — 80061 LIPID PANEL: CPT | Performed by: NURSE PRACTITIONER

## 2025-05-19 PROCEDURE — 99214 OFFICE O/P EST MOD 30 MIN: CPT | Performed by: NURSE PRACTITIONER

## 2025-05-19 PROCEDURE — 36415 COLL VENOUS BLD VENIPUNCTURE: CPT | Performed by: NURSE PRACTITIONER

## 2025-05-19 PROCEDURE — 80053 COMPREHEN METABOLIC PANEL: CPT | Performed by: NURSE PRACTITIONER

## 2025-05-19 PROCEDURE — 83036 HEMOGLOBIN GLYCOSYLATED A1C: CPT | Performed by: NURSE PRACTITIONER

## 2025-05-19 PROCEDURE — 84443 ASSAY THYROID STIM HORMONE: CPT | Performed by: NURSE PRACTITIONER

## 2025-05-19 PROCEDURE — 3077F SYST BP >= 140 MM HG: CPT | Performed by: NURSE PRACTITIONER

## 2025-05-19 PROCEDURE — 1125F AMNT PAIN NOTED PAIN PRSNT: CPT | Performed by: NURSE PRACTITIONER

## 2025-05-19 PROCEDURE — 3080F DIAST BP >= 90 MM HG: CPT | Performed by: NURSE PRACTITIONER

## 2025-05-19 RX ORDER — TRIAMCINOLONE ACETONIDE 1 MG/G
OINTMENT TOPICAL
COMMUNITY
Start: 2025-03-04 | End: 2025-05-19

## 2025-05-19 SDOH — HEALTH STABILITY: PHYSICAL HEALTH: ON AVERAGE, HOW MANY MINUTES DO YOU ENGAGE IN EXERCISE AT THIS LEVEL?: 30 MIN

## 2025-05-19 SDOH — HEALTH STABILITY: PHYSICAL HEALTH: ON AVERAGE, HOW MANY DAYS PER WEEK DO YOU ENGAGE IN MODERATE TO STRENUOUS EXERCISE (LIKE A BRISK WALK)?: 5 DAYS

## 2025-05-19 ASSESSMENT — PATIENT HEALTH QUESTIONNAIRE - PHQ9
10. IF YOU CHECKED OFF ANY PROBLEMS, HOW DIFFICULT HAVE THESE PROBLEMS MADE IT FOR YOU TO DO YOUR WORK, TAKE CARE OF THINGS AT HOME, OR GET ALONG WITH OTHER PEOPLE: SOMEWHAT DIFFICULT
SUM OF ALL RESPONSES TO PHQ QUESTIONS 1-9: 2
SUM OF ALL RESPONSES TO PHQ QUESTIONS 1-9: 2

## 2025-05-19 ASSESSMENT — SOCIAL DETERMINANTS OF HEALTH (SDOH): HOW OFTEN DO YOU GET TOGETHER WITH FRIENDS OR RELATIVES?: MORE THAN THREE TIMES A WEEK

## 2025-05-19 ASSESSMENT — PAIN SCALES - GENERAL: PAINLEVEL_OUTOF10: MODERATE PAIN (5)

## 2025-05-19 NOTE — PROGRESS NOTES
Assessment & Plan     Class 1 obesity due to excess calories with serious comorbidity and body mass index (BMI) of 34.0 to 34.9 in adult  Patient continues to work on weight loss through diet and exercise but is frustrated by lack of progress.  We reviewed his history of prediabetes and he is interested in starting a GLP-1 potentially.  He will check with his insurance company first to provide coverage.  He is due to have labs rechecked today so I will follow-up with results when available.  We discussed the importance of cutting back alcohol intake to promote weight loss and overall health.  Offered referral to weight loss management team which she declines at this time.  - Lipid panel reflex to direct LDL Fasting  - TSH with free T4 reflex  - Comprehensive metabolic panel (BMP + Alb, Alk Phos, ALT, AST, Total. Bili, TP)  - Lipid panel reflex to direct LDL Fasting  - TSH with free T4 reflex  - Comprehensive metabolic panel (BMP + Alb, Alk Phos, ALT, AST, Total. Bili, TP)    Prediabetes  Last hemoglobin A1c was 6.4 roughly 6 months ago.  Will recheck this today.  - Hemoglobin A1c  - Hemoglobin A1c    Hepatic steatosis  Reviewed history of hepatic steatosis likely related to alcohol intake.  Encouraged continued efforts at cutting back and offered resources today.  Will recheck liver function test today.  - Comprehensive metabolic panel (BMP + Alb, Alk Phos, ALT, AST, Total. Bili, TP)  - Comprehensive metabolic panel (BMP + Alb, Alk Phos, ALT, AST, Total. Bili, TP)    Skin lesion  Patient has flesh-colored lesion on the scalp.  He does have a few other skin lesions on his back as well that he would like assessed.  Referral to dermatology placed today.  - Adult Dermatology  Referral    Rash  Rash is improving significantly with use of Lotrimin.  He will continue this for an additional 1 to 2 weeks and follow-up if symptoms persist or worsen at all.        BMI  Estimated body mass index is 36.39 kg/m  as  "calculated from the following:    Height as of this encounter: 1.924 m (6' 3.75\").    Weight as of this encounter: 134.7 kg (297 lb).   Weight management plan: Discussed healthy diet and exercise guidelines    Counseling  Appropriate preventive services were addressed with this patient via screening, questionnaire, or discussion as appropriate for fall prevention, nutrition, physical activity, Tobacco-use cessation, social engagement, weight loss and cognition.  Checklist reviewing preventive services available has been given to the patient.  Reviewed patient's diet, addressing concerns and/or questions.   The patient was instructed to see the dentist every 6 months.   The patient reports drinking more than 3 alcoholic drinks per day and/or more than 7 drhnks per week. The patient was counseled and given information about possible harmful effects of excessive alcohol intake.        Carli Gotti is a 39 year old, presenting for the following health issues:  Medication Question, Rash, and Shoulder (Right shoulder)      5/19/2025    10:20 AM   Additional Questions   Roomed by Ignacia LOMAX      The patient presents today to discuss weight loss and to reevaluate underarm rash.  Patient states that he has been working on trying to lose weight through diet and exercise but has had a very difficult time doing so.  He has been trying intermittent fasting.  Drinks mostly water throughout the day.  He does have a history of prediabetes with most recent hemoglobin A1c of 6.4 roughly 6 months ago.  He is due to have this rechecked today.  He is interested in discussing GLP-1 medication to help with weight loss.  He has a strong family history of diabetes.    The patient continues to drink alcohol, a few drinks per day and mostly beer.  It is mostly social drinking.  He is rarely drinking hard liquor at this point.  He continues to try to cut back but is not interested in resources at this time.    He was seen for an " "e-visit last week for an underarm rash.  He states that this came on fairly suddenly last week he noticed redness, swelling, cracking and itching/pain.  He attributed the rash to heat rash reaction to his deodorant.  He was recommended to try Lotrimin which she has been using for the last 4 days.  He has noticed significant improvement.  He stopped using his prior deodorant as well.    The patient has a skin lesion on the top of his scalp which is hard to visualize.  He would like to have this evaluated today.  He first noticed it when washing his hair in the shower.  He thought it was a scab initially and tried to pick it off.  It has grown back.  Does not cause any bleeding or pain.    Review of Systems - pertinent positives noted in HPI, otherwise ROS is negative.        Objective    BP (!) 155/97   Pulse 81   Temp 98.3  F (36.8  C)   Resp 20   Ht 1.924 m (6' 3.75\")   Wt 134.7 kg (297 lb)   SpO2 98%   BMI 36.39 kg/m    Body mass index is 36.39 kg/m .  Physical Exam   GENERAL: alert and no distress  RESP: lungs clear to auscultation - no rales, rhonchi or wheezes  CV: regular rate and rhythm, normal S1 S2, no S3 or S4, no murmur, click or rub, no peripheral edema  MS: no gross musculoskeletal defects noted, no edema  SKIN: Patient has approximately 4 mm round flesh-colored raised lesion noted on top of scalp.  No pain.  No surrounding erythema, edema.  Underarm rash is well-demarcated area that is mildly erythematous and dry appearing.  PSYCH: mentation appears normal, affect normal/bright        Signed Electronically by: ALBERT Blanco CNP      Answers submitted by the patient for this visit:  Patient Health Questionnaire (Submitted on 5/19/2025)  If you checked off any problems, how difficult have these problems made it for you to do your work, take care of things at home, or get along with other people?: Somewhat difficult  PHQ9 TOTAL SCORE: 2    "

## 2025-05-20 ENCOUNTER — PATIENT OUTREACH (OUTPATIENT)
Dept: CARE COORDINATION | Facility: CLINIC | Age: 40
End: 2025-05-20
Payer: COMMERCIAL

## 2025-05-22 ENCOUNTER — RESULTS FOLLOW-UP (OUTPATIENT)
Dept: FAMILY MEDICINE | Facility: CLINIC | Age: 40
End: 2025-05-22

## 2025-05-22 ENCOUNTER — PATIENT OUTREACH (OUTPATIENT)
Dept: CARE COORDINATION | Facility: CLINIC | Age: 40
End: 2025-05-22
Payer: COMMERCIAL

## 2025-05-22 ENCOUNTER — TELEPHONE (OUTPATIENT)
Dept: FAMILY MEDICINE | Facility: CLINIC | Age: 40
End: 2025-05-22